# Patient Record
Sex: FEMALE | Race: WHITE | NOT HISPANIC OR LATINO | URBAN - METROPOLITAN AREA
[De-identification: names, ages, dates, MRNs, and addresses within clinical notes are randomized per-mention and may not be internally consistent; named-entity substitution may affect disease eponyms.]

---

## 2017-07-25 ENCOUNTER — INPATIENT (INPATIENT)
Facility: HOSPITAL | Age: 37
LOS: 2 days | Discharge: HOME | DRG: 355 | End: 2017-07-28
Attending: INTERNAL MEDICINE | Admitting: HOSPITALIST
Payer: SELF-PAY

## 2017-07-25 VITALS
WEIGHT: 121.03 LBS | DIASTOLIC BLOOD PRESSURE: 68 MMHG | SYSTOLIC BLOOD PRESSURE: 96 MMHG | TEMPERATURE: 98 F | OXYGEN SATURATION: 97 % | HEART RATE: 59 BPM | HEIGHT: 61 IN | RESPIRATION RATE: 15 BRPM

## 2017-07-25 DIAGNOSIS — K56.69 OTHER INTESTINAL OBSTRUCTION: ICD-10-CM

## 2017-07-25 DIAGNOSIS — Z98.891 HISTORY OF UTERINE SCAR FROM PREVIOUS SURGERY: Chronic | ICD-10-CM

## 2017-07-25 DIAGNOSIS — R07.89 OTHER CHEST PAIN: ICD-10-CM

## 2017-07-25 DIAGNOSIS — D72.829 ELEVATED WHITE BLOOD CELL COUNT, UNSPECIFIED: ICD-10-CM

## 2017-07-25 LAB
ALBUMIN SERPL ELPH-MCNC: 3.4 G/DL — SIGNIFICANT CHANGE UP (ref 3.3–5)
ALP SERPL-CCNC: 46 U/L — SIGNIFICANT CHANGE UP (ref 40–120)
ALT FLD-CCNC: 19 U/L — SIGNIFICANT CHANGE UP (ref 12–78)
AMYLASE P1 CFR SERPL: 31 U/L — SIGNIFICANT CHANGE UP (ref 25–115)
ANION GAP SERPL CALC-SCNC: 5 MMOL/L — SIGNIFICANT CHANGE UP (ref 5–17)
APPEARANCE UR: CLEAR — SIGNIFICANT CHANGE UP
AST SERPL-CCNC: 17 U/L — SIGNIFICANT CHANGE UP (ref 15–37)
BASOPHILS # BLD AUTO: 0.1 K/UL — SIGNIFICANT CHANGE UP (ref 0–0.2)
BASOPHILS NFR BLD AUTO: 0.4 % — SIGNIFICANT CHANGE UP (ref 0–2)
BILIRUB SERPL-MCNC: 0.4 MG/DL — SIGNIFICANT CHANGE UP (ref 0.2–1.2)
BILIRUB UR-MCNC: NEGATIVE — SIGNIFICANT CHANGE UP
BUN SERPL-MCNC: 15 MG/DL — SIGNIFICANT CHANGE UP (ref 7–23)
CALCIUM SERPL-MCNC: 8.6 MG/DL — SIGNIFICANT CHANGE UP (ref 8.5–10.1)
CHLORIDE SERPL-SCNC: 107 MMOL/L — SIGNIFICANT CHANGE UP (ref 96–108)
CO2 SERPL-SCNC: 27 MMOL/L — SIGNIFICANT CHANGE UP (ref 22–31)
COLOR SPEC: YELLOW — SIGNIFICANT CHANGE UP
CREAT SERPL-MCNC: 0.65 MG/DL — SIGNIFICANT CHANGE UP (ref 0.5–1.3)
DIFF PNL FLD: NEGATIVE — SIGNIFICANT CHANGE UP
EOSINOPHIL # BLD AUTO: 0 K/UL — SIGNIFICANT CHANGE UP (ref 0–0.5)
EOSINOPHIL NFR BLD AUTO: 0.3 % — SIGNIFICANT CHANGE UP (ref 0–6)
GLUCOSE SERPL-MCNC: 126 MG/DL — HIGH (ref 70–99)
GLUCOSE UR QL: NEGATIVE — SIGNIFICANT CHANGE UP
HCG SERPL-ACNC: <1 MIU/ML — SIGNIFICANT CHANGE UP
HCT VFR BLD CALC: 39.7 % — SIGNIFICANT CHANGE UP (ref 34.5–45)
HGB BLD-MCNC: 12.8 G/DL — SIGNIFICANT CHANGE UP (ref 11.5–15.5)
KETONES UR-MCNC: NEGATIVE — SIGNIFICANT CHANGE UP
LACTATE SERPL-SCNC: 1 MMOL/L — SIGNIFICANT CHANGE UP (ref 0.7–2)
LEUKOCYTE ESTERASE UR-ACNC: NEGATIVE — SIGNIFICANT CHANGE UP
LIDOCAIN IGE QN: 144 U/L — SIGNIFICANT CHANGE UP (ref 73–393)
LYMPHOCYTES # BLD AUTO: 1.2 K/UL — SIGNIFICANT CHANGE UP (ref 1–3.3)
LYMPHOCYTES # BLD AUTO: 9.1 % — LOW (ref 13–44)
MCHC RBC-ENTMCNC: 28 PG — SIGNIFICANT CHANGE UP (ref 27–34)
MCHC RBC-ENTMCNC: 32.3 GM/DL — SIGNIFICANT CHANGE UP (ref 32–36)
MCV RBC AUTO: 86.5 FL — SIGNIFICANT CHANGE UP (ref 80–100)
MONOCYTES # BLD AUTO: 0.4 K/UL — SIGNIFICANT CHANGE UP (ref 0–0.9)
MONOCYTES NFR BLD AUTO: 3.3 % — SIGNIFICANT CHANGE UP (ref 1–9)
NEUTROPHILS # BLD AUTO: 11.5 K/UL — HIGH (ref 1.8–7.4)
NEUTROPHILS NFR BLD AUTO: 86.9 % — HIGH (ref 43–77)
NITRITE UR-MCNC: NEGATIVE — SIGNIFICANT CHANGE UP
PH UR: 7 — SIGNIFICANT CHANGE UP (ref 5–8)
PLATELET # BLD AUTO: 284 K/UL — SIGNIFICANT CHANGE UP (ref 150–400)
POTASSIUM SERPL-MCNC: 3.9 MMOL/L — SIGNIFICANT CHANGE UP (ref 3.5–5.3)
POTASSIUM SERPL-SCNC: 3.9 MMOL/L — SIGNIFICANT CHANGE UP (ref 3.5–5.3)
PROT SERPL-MCNC: 7.1 G/DL — SIGNIFICANT CHANGE UP (ref 6–8.3)
PROT UR-MCNC: NEGATIVE — SIGNIFICANT CHANGE UP
RBC # BLD: 4.59 M/UL — SIGNIFICANT CHANGE UP (ref 3.8–5.2)
RBC # FLD: 12.1 % — SIGNIFICANT CHANGE UP (ref 10.3–14.5)
SODIUM SERPL-SCNC: 139 MMOL/L — SIGNIFICANT CHANGE UP (ref 135–145)
SP GR SPEC: 1 — LOW (ref 1.01–1.02)
UROBILINOGEN FLD QL: NEGATIVE — SIGNIFICANT CHANGE UP
WBC # BLD: 13.2 K/UL — HIGH (ref 3.8–10.5)
WBC # FLD AUTO: 13.2 K/UL — HIGH (ref 3.8–10.5)

## 2017-07-25 PROCEDURE — 71020: CPT | Mod: 26

## 2017-07-25 PROCEDURE — 74177 CT ABD & PELVIS W/CONTRAST: CPT | Mod: 26

## 2017-07-25 PROCEDURE — 93010 ELECTROCARDIOGRAM REPORT: CPT

## 2017-07-25 PROCEDURE — 44050 REDUCE BOWEL OBSTRUCTION: CPT | Mod: AS

## 2017-07-25 PROCEDURE — 99285 EMERGENCY DEPT VISIT HI MDM: CPT

## 2017-07-25 PROCEDURE — 99223 1ST HOSP IP/OBS HIGH 75: CPT | Mod: AI

## 2017-07-25 PROCEDURE — 99223 1ST HOSP IP/OBS HIGH 75: CPT

## 2017-07-25 PROCEDURE — 71275 CT ANGIOGRAPHY CHEST: CPT | Mod: 26

## 2017-07-25 RX ORDER — ONDANSETRON 8 MG/1
4 TABLET, FILM COATED ORAL ONCE
Qty: 0 | Refills: 0 | Status: COMPLETED | OUTPATIENT
Start: 2017-07-25 | End: 2017-07-25

## 2017-07-25 RX ORDER — SODIUM CHLORIDE 9 MG/ML
3 INJECTION INTRAMUSCULAR; INTRAVENOUS; SUBCUTANEOUS ONCE
Qty: 0 | Refills: 0 | Status: COMPLETED | OUTPATIENT
Start: 2017-07-25 | End: 2017-07-25

## 2017-07-25 RX ORDER — MORPHINE SULFATE 50 MG/1
2 CAPSULE, EXTENDED RELEASE ORAL EVERY 6 HOURS
Qty: 0 | Refills: 0 | Status: DISCONTINUED | OUTPATIENT
Start: 2017-07-25 | End: 2017-07-25

## 2017-07-25 RX ORDER — ENOXAPARIN SODIUM 100 MG/ML
40 INJECTION SUBCUTANEOUS EVERY 24 HOURS
Qty: 0 | Refills: 0 | Status: DISCONTINUED | OUTPATIENT
Start: 2017-07-25 | End: 2017-07-28

## 2017-07-25 RX ORDER — METOCLOPRAMIDE HCL 10 MG
10 TABLET ORAL EVERY 8 HOURS
Qty: 0 | Refills: 0 | Status: DISCONTINUED | OUTPATIENT
Start: 2017-07-25 | End: 2017-07-26

## 2017-07-25 RX ORDER — SODIUM CHLORIDE 9 MG/ML
1000 INJECTION, SOLUTION INTRAVENOUS
Qty: 0 | Refills: 0 | Status: DISCONTINUED | OUTPATIENT
Start: 2017-07-25 | End: 2017-07-27

## 2017-07-25 RX ORDER — MORPHINE SULFATE 50 MG/1
4 CAPSULE, EXTENDED RELEASE ORAL ONCE
Qty: 0 | Refills: 0 | Status: DISCONTINUED | OUTPATIENT
Start: 2017-07-25 | End: 2017-07-25

## 2017-07-25 RX ORDER — ONDANSETRON 8 MG/1
4 TABLET, FILM COATED ORAL EVERY 8 HOURS
Qty: 0 | Refills: 0 | Status: DISCONTINUED | OUTPATIENT
Start: 2017-07-25 | End: 2017-07-25

## 2017-07-25 RX ORDER — SODIUM CHLORIDE 9 MG/ML
1000 INJECTION INTRAMUSCULAR; INTRAVENOUS; SUBCUTANEOUS
Qty: 0 | Refills: 0 | Status: COMPLETED | OUTPATIENT
Start: 2017-07-25 | End: 2017-07-25

## 2017-07-25 RX ORDER — MORPHINE SULFATE 50 MG/1
2 CAPSULE, EXTENDED RELEASE ORAL EVERY 4 HOURS
Qty: 0 | Refills: 0 | Status: DISCONTINUED | OUTPATIENT
Start: 2017-07-25 | End: 2017-07-28

## 2017-07-25 RX ADMIN — SODIUM CHLORIDE 1000 MILLILITER(S): 9 INJECTION INTRAMUSCULAR; INTRAVENOUS; SUBCUTANEOUS at 12:06

## 2017-07-25 RX ADMIN — ENOXAPARIN SODIUM 40 MILLIGRAM(S): 100 INJECTION SUBCUTANEOUS at 21:27

## 2017-07-25 RX ADMIN — MORPHINE SULFATE 4 MILLIGRAM(S): 50 CAPSULE, EXTENDED RELEASE ORAL at 12:21

## 2017-07-25 RX ADMIN — Medication 10 MILLIGRAM(S): at 17:47

## 2017-07-25 RX ADMIN — MORPHINE SULFATE 2 MILLIGRAM(S): 50 CAPSULE, EXTENDED RELEASE ORAL at 21:34

## 2017-07-25 RX ADMIN — MORPHINE SULFATE 2 MILLIGRAM(S): 50 CAPSULE, EXTENDED RELEASE ORAL at 21:27

## 2017-07-25 RX ADMIN — MORPHINE SULFATE 2 MILLIGRAM(S): 50 CAPSULE, EXTENDED RELEASE ORAL at 17:44

## 2017-07-25 RX ADMIN — MORPHINE SULFATE 4 MILLIGRAM(S): 50 CAPSULE, EXTENDED RELEASE ORAL at 10:50

## 2017-07-25 RX ADMIN — MORPHINE SULFATE 2 MILLIGRAM(S): 50 CAPSULE, EXTENDED RELEASE ORAL at 14:11

## 2017-07-25 RX ADMIN — SODIUM CHLORIDE 3 MILLILITER(S): 9 INJECTION INTRAMUSCULAR; INTRAVENOUS; SUBCUTANEOUS at 10:51

## 2017-07-25 RX ADMIN — MORPHINE SULFATE 2 MILLIGRAM(S): 50 CAPSULE, EXTENDED RELEASE ORAL at 13:56

## 2017-07-25 RX ADMIN — ONDANSETRON 104 MILLIGRAM(S): 8 TABLET, FILM COATED ORAL at 13:56

## 2017-07-25 RX ADMIN — MORPHINE SULFATE 2 MILLIGRAM(S): 50 CAPSULE, EXTENDED RELEASE ORAL at 17:23

## 2017-07-25 RX ADMIN — SODIUM CHLORIDE 1000 MILLILITER(S): 9 INJECTION INTRAMUSCULAR; INTRAVENOUS; SUBCUTANEOUS at 11:31

## 2017-07-25 RX ADMIN — MORPHINE SULFATE 4 MILLIGRAM(S): 50 CAPSULE, EXTENDED RELEASE ORAL at 11:05

## 2017-07-25 RX ADMIN — SODIUM CHLORIDE 1000 MILLILITER(S): 9 INJECTION INTRAMUSCULAR; INTRAVENOUS; SUBCUTANEOUS at 10:50

## 2017-07-25 RX ADMIN — ONDANSETRON 4 MILLIGRAM(S): 8 TABLET, FILM COATED ORAL at 10:50

## 2017-07-25 RX ADMIN — MORPHINE SULFATE 4 MILLIGRAM(S): 50 CAPSULE, EXTENDED RELEASE ORAL at 12:06

## 2017-07-25 NOTE — ED PROVIDER NOTE - OBJECTIVE STATEMENT
35 yo F p/w developed epigastric pain this am rad to lower abd and rad to chest. Onset at rest. Mild assoc sob due to pain. Pos nausea , vomiting. No diarrhea. No recent trauma. NO neck/ back pain. No numb/ting/focal weak. No recent travel / sick contacts. NO trauma. no agg/allev factors. No other inj or co.

## 2017-07-25 NOTE — CONSULT NOTE ADULT - GASTROINTESTINAL DETAILS
no guarding/no organomegaly/no rigidity/no masses palpable/no rebound tenderness/no distention/bowel sounds normal/no bruit

## 2017-07-25 NOTE — CONSULT NOTE ADULT - SUBJECTIVE AND OBJECTIVE BOX
36 year old woman presenting with 2 day history of not feeling well with pain radiating to back, chest and abdomen with at least one episode of non bilious vomiting.  CT C/A/P performed in ED without PO contrast suggestive of proximally dilated SB with distal loops collapsed.  Colon however full of gas and stool.  Pt also reports last BM early this morning.  Struggles with chronic constipation.  Denies sick contacts or recent travel.      PAST MEDICAL & SURGICAL HISTORY:  Cholelithiasis/Cholecystitis  S/P  section  Lap Cholecystectomy      MEDICATIONS  (STANDING):  dextrose 5% + sodium chloride 0.9%. 1000 milliLiter(s) (100 mL/Hr) IV Continuous <Continuous>  enoxaparin Injectable 40 milliGRAM(s) SubCutaneous every 24 hours    MEDICATIONS  (PRN):  morphine  - Injectable 2 milliGRAM(s) IV Push every 4 hours PRN Severe Pain (7 - 10)  metoclopramide Injectable 10 milliGRAM(s) IV Push every 8 hours PRN Nausea/ Vomiting      Allergies:  narcotic analgesics (Unknown)      SOCIAL HISTORY:  Denies Tobacco or recent ETOH    FAMILY HISTORY:  No pertinent family history in first degree relatives      Vital Signs Last 24 Hrs  T(C): 37.5 (2017 19:39), Max: 37.5 (2017 19:39)  T(F): 99.5 (2017 19:39), Max: 99.5 (2017 19:39)  HR: 70 (2017 19:39) (59 - 70)  BP: 103/66 (2017 19:39) (96/68 - 118/74)  BP(mean): --  RR: 16 (2017 19:39) (15 - 16)  SpO2: 98% (2017 19:39) (97% - 99%)    LABS:                        12.8   13.2  )-----------( 284      ( 2017 10:45 )             39.7     07-25    139  |  107  |  15  ----------------------------<  126<H>  3.9   |  27  |  0.65    Ca    8.6      2017 10:45    TPro  7.1  /  Alb  3.4  /  TBili  0.4  /  DBili  x   /  AST  17  /  ALT  19  /  AlkPhos  46  07-25      Urinalysis Basic - ( 2017 13:58 )    Color: Yellow / Appearance: Clear / S.005 / pH: x  Gluc: x / Ketone: Negative  / Bili: Negative / Urobili: Negative   Blood: x / Protein: Negative / Nitrite: Negative   Leuk Esterase: Negative / RBC: x / WBC x   Sq Epi: x / Non Sq Epi: x / Bacteria: x        RADIOLOGY & ADDITIONAL STUDIES:      EXAM:  CT ABDOMEN AND PELVIS IC                          EXAM:  CT ANGIO CHEST (W)AW IC                            PROCEDURE DATE:  2017          INTERPRETATION:  CT angiogram of the chest, abdomen, and pelvis. Patient   is post  andhas epigastric pain radiating to the back. There is   also concern of possible pulmonary embolism. Coronal MIP imaging of the   chest was included with the images.    Patient received 95 cc of Omnipaque 350 IV by power injector. 5 cc was   discarded.For the chest pulmonary embolism protocol was utilized.    The included thyroid is unremarkable.    Heart size is within normal limits. The ascending aorta is 2.6 cm which   is in the normal range.    The aorta has a gradual taper.    The mediastinumand rufus appear to be made of normal vascular and tissue   elements. No pulmonary emboli are evident out to the subsegmental level.    The lung fields and pleural surfaces are unremarkable.    The bony thorax is unremarkable.    Abdomen and pelvis.    Clips in the gallbladder area noted.    There is mild diffuse prominence of the biliary ducts often seen after   cholecystectomy.    The liver, spleen, and pancreas are unremarkable.    No adrenal enlargements are evident.    The kidneys appear normal in size, shape, and axis. There are no renal   stones. The kidneys function equally without hydronephrosis.    The abdominal aorta is unremarkable. No celiac or periaortic adenopathy   is evident.    CT pelvis.    The bladder and uterus are grossly normal. No iliac or inguinal   adenopathy is evident.    There is no bowel obstruction. There is no sense of mass or infiltration   relevant to the colon. The cecum lies in the left abdomen. The appendix   is not identified. There are no secondary signs of appendicitis.    There is mild distention of some jejunal loops in the left mid abdomen.   The distention is up to 2.8 cm. The zone of transition appears to be just   behind the umbilicus best seen on image numbers 316 and 317 of series 2.   This is also roughly at the jejunoileal junction. Distal small bowel   loops are decompressed.    Bones of the lower trunk are unremarkable.    IMPRESSION: No evidence of pulmonary emboli. Mobile cecum.    There is mild distention of small bowel loops at about the umbilical   level roughly at the jejunal ileal junction where a transition point is   noted.      VELMA BLAKE M.D., ATTENDING RADIOLOGIST  This document has been electronically signed. 2017  1:02PM

## 2017-07-25 NOTE — H&P ADULT - NSHPLABSRESULTS_GEN_ALL_CORE
LABS:                        12.8   13.2  )-----------( 284      ( 25 Jul 2017 10:45 )             39.7     25 Jul 2017 10:45    139    |  107    |  15     ----------------------------<  126    3.9     |  27     |  0.65     Ca    8.6        25 Jul 2017 10:45    TPro  7.1    /  Alb  3.4    /  TBili  0.4    /  DBili  x      /  AST  17     /  ALT  19     /  AlkPhos  46     25 Jul 2017 10:45      CAPILLARY BLOOD GLUCOSE        BLOOD CULTURE    RADIOLOGY & ADDITIONAL TESTS:    Imaging Personally Reviewed:  [ ] YES     Consultant(s) Notes Reviewed:      Care Discussed with Consultants/Other Providers:

## 2017-07-25 NOTE — ED PROVIDER NOTE - ENMT, MLM
Airway patent, Nasal mucosa clear. Mouth with normal mucosa. Throat has no vesicles, no oropharyngeal exudates and uvula is midline. MM Moist

## 2017-07-25 NOTE — H&P ADULT - NSHPREVIEWOFSYSTEMS_GEN_ALL_CORE
CONSTITUTIONAL: No fever, weight loss, or fatigue  EYES: No eye pain, visual disturbances, or discharge  ENMT:  No difficulty hearing, tinnitus, vertigo; No sinus or throat pain  NECK: No pain or stiffness  BREASTS: No pain, masses, or nipple discharge  RESPIRATORY: No cough, wheezing, chills or hemoptysis; No shortness of breath  CARDIOVASCULAR: No chest pain, palpitations, dizziness, or leg swelling  GASTROINTESTINAL: Dull generalized abdominal pain, nausea and vomiting x 2, No diarrhea or constipation. No melena or hematochezia.  GENITOURINARY: No dysuria, frequency, hematuria, or incontinence  NEUROLOGICAL: No headaches, memory loss, loss of strength, numbness, or tremors  SKIN: No itching, burning, rashes, or lesions   LYMPH NODES: No enlarged glands  ENDOCRINE: No heat or cold intolerance; No hair loss; No polydipsia or polyuria  MUSCULOSKELETAL: No joint pain or swelling; No muscle, back, or extremity pain  PSYCHIATRIC: No depression, anxiety, mood swings, or difficulty sleeping  HEME/LYMPH: No easy bruising, or bleeding gums  ALLERGY AND IMMUNOLOGIC: No hives or eczema

## 2017-07-25 NOTE — CONSULT NOTE ADULT - NEGATIVE GENERAL SYMPTOMS
no anorexia/no weight gain/no polyuria/no sweating/no polyphagia/no polydipsia/no weight loss/no fever

## 2017-07-25 NOTE — CONSULT NOTE ADULT - NEGATIVE GASTROINTESTINAL SYMPTOMS
no steatorrhea/no hematochezia/no hiccoughs/no diarrhea/no jaundice/no change in bowel habits/no nausea/no melena

## 2017-07-25 NOTE — PROGRESS NOTE ADULT - SUBJECTIVE AND OBJECTIVE BOX
Pt seen and examined  Images reviewed.   Full consult not to follow.    Partial SBO vs ileus vs enteritis.  Flu like symptoms with generalized body aches, nausea and vomiting x 1.  Last BM this morning.    Recommend Medical evaluation and possible admission.  No acute surgical intervention at this time.   Repeat Abd xray flat and upright in am.  PPI's.  IVF's.  Keep NPO.    Will follow.

## 2017-07-25 NOTE — H&P ADULT - HISTORY OF PRESENT ILLNESS
35 y/o F h/o, cholecystectomy, C sections X3 presents c/o generalized abdominal pain, constant, dull, non radiating associated with nausea, vomiting x 2 this am. Denies any chest pain, sob or any other symptoms. Admitted for SBO. 37 y/o F h/o, cholecystectomy, C sections X3 presents c/o generalized abdominal pain, constant, dull, non radiating associated with nausea, vomiting x 2 this am. Denies any chest pain, sob or any other symptoms. Also reported mild chest pain associated with abdominal pain to ER physician. But no chest pain now.  Admitted for SBO.

## 2017-07-25 NOTE — H&P ADULT - NSHPPHYSICALEXAM_GEN_ALL_CORE
Vital Signs Last 24 Hrs  T(C): 36.8 (25 Jul 2017 12:55), Max: 36.8 (25 Jul 2017 12:55)  T(F): 98.3 (25 Jul 2017 12:55), Max: 98.3 (25 Jul 2017 12:55)  HR: 65 (25 Jul 2017 12:55) (59 - 65)  BP: 112/72 (25 Jul 2017 12:55) (96/68 - 112/72)  BP(mean): --  RR: 16 (25 Jul 2017 12:55) (15 - 16)  SpO2: 98% (25 Jul 2017 12:55) (97% - 98%)    PHYSICAL EXAM:  GENERAL: NAD, well-groomed, well-developed  HEAD:  Atraumatic, Normocephalic  EYES: EOMI, PERRLA, conjunctiva and sclera clear  ENMT: No tonsillar erythema, exudates, or enlargement; Moist mucous membranes, Good dentition, No lesions  NECK: Supple, No JVD, Normal thyroid  NERVOUS SYSTEM:  Alert & Oriented X3, Good concentration; Motor Strength 5/5 B/L upper and lower extremities; DTRs 2+ intact and symmetric  CHEST/LUNG: Clear to auscultation bilaterally; No rales, rhonchi, wheezing, or rubs  HEART: Regular rate and rhythm; No murmurs, rubs, or gallops  ABDOMEN: Distended, mild generalized abdominal tenderness; Bowel sounds absent  EXTREMITIES:  2+ Peripheral Pulses, No clubbing, cyanosis, or edema  LYMPH: No lymphadenopathy noted  SKIN: No rashes or lesions

## 2017-07-26 DIAGNOSIS — E83.39 OTHER DISORDERS OF PHOSPHORUS METABOLISM: ICD-10-CM

## 2017-07-26 DIAGNOSIS — E87.6 HYPOKALEMIA: ICD-10-CM

## 2017-07-26 LAB
ANION GAP SERPL CALC-SCNC: 7 MMOL/L — SIGNIFICANT CHANGE UP (ref 5–17)
BUN SERPL-MCNC: 6 MG/DL — LOW (ref 7–23)
CALCIUM SERPL-MCNC: 7.3 MG/DL — LOW (ref 8.5–10.1)
CHLORIDE SERPL-SCNC: 109 MMOL/L — HIGH (ref 96–108)
CO2 SERPL-SCNC: 26 MMOL/L — SIGNIFICANT CHANGE UP (ref 22–31)
CREAT SERPL-MCNC: 0.48 MG/DL — LOW (ref 0.5–1.3)
CULTURE RESULTS: SIGNIFICANT CHANGE UP
GLUCOSE SERPL-MCNC: 121 MG/DL — HIGH (ref 70–99)
HCT VFR BLD CALC: 36.4 % — SIGNIFICANT CHANGE UP (ref 34.5–45)
HGB BLD-MCNC: 11.8 G/DL — SIGNIFICANT CHANGE UP (ref 11.5–15.5)
INR BLD: 1.07 RATIO — SIGNIFICANT CHANGE UP (ref 0.88–1.16)
MCHC RBC-ENTMCNC: 27.8 PG — SIGNIFICANT CHANGE UP (ref 27–34)
MCHC RBC-ENTMCNC: 32.3 GM/DL — SIGNIFICANT CHANGE UP (ref 32–36)
MCV RBC AUTO: 86.2 FL — SIGNIFICANT CHANGE UP (ref 80–100)
PLATELET # BLD AUTO: 268 K/UL — SIGNIFICANT CHANGE UP (ref 150–400)
POTASSIUM SERPL-MCNC: 3.2 MMOL/L — LOW (ref 3.5–5.3)
POTASSIUM SERPL-SCNC: 3.2 MMOL/L — LOW (ref 3.5–5.3)
PROTHROM AB SERPL-ACNC: 11.7 SEC — SIGNIFICANT CHANGE UP (ref 9.8–12.7)
RBC # BLD: 4.22 M/UL — SIGNIFICANT CHANGE UP (ref 3.8–5.2)
RBC # FLD: 11.7 % — SIGNIFICANT CHANGE UP (ref 10.3–14.5)
SODIUM SERPL-SCNC: 142 MMOL/L — SIGNIFICANT CHANGE UP (ref 135–145)
SPECIMEN SOURCE: SIGNIFICANT CHANGE UP
WBC # BLD: 11.1 K/UL — HIGH (ref 3.8–10.5)
WBC # FLD AUTO: 11.1 K/UL — HIGH (ref 3.8–10.5)

## 2017-07-26 PROCEDURE — 74020: CPT | Mod: 26

## 2017-07-26 PROCEDURE — 99233 SBSQ HOSP IP/OBS HIGH 50: CPT | Mod: GC

## 2017-07-26 PROCEDURE — 99232 SBSQ HOSP IP/OBS MODERATE 35: CPT | Mod: 57

## 2017-07-26 RX ORDER — POTASSIUM CHLORIDE 20 MEQ
40 PACKET (EA) ORAL EVERY 4 HOURS
Qty: 0 | Refills: 0 | Status: COMPLETED | OUTPATIENT
Start: 2017-07-26 | End: 2017-07-26

## 2017-07-26 RX ORDER — KETOROLAC TROMETHAMINE 30 MG/ML
15 SYRINGE (ML) INJECTION EVERY 6 HOURS
Qty: 0 | Refills: 0 | Status: DISCONTINUED | OUTPATIENT
Start: 2017-07-26 | End: 2017-07-28

## 2017-07-26 RX ORDER — METOCLOPRAMIDE HCL 10 MG
5 TABLET ORAL ONCE
Qty: 0 | Refills: 0 | Status: COMPLETED | OUTPATIENT
Start: 2017-07-26 | End: 2017-07-26

## 2017-07-26 RX ORDER — METOCLOPRAMIDE HCL 10 MG
5 TABLET ORAL EVERY 8 HOURS
Qty: 0 | Refills: 0 | Status: DISCONTINUED | OUTPATIENT
Start: 2017-07-26 | End: 2017-07-27

## 2017-07-26 RX ORDER — ACETAMINOPHEN 500 MG
650 TABLET ORAL EVERY 6 HOURS
Qty: 0 | Refills: 0 | Status: DISCONTINUED | OUTPATIENT
Start: 2017-07-26 | End: 2017-07-28

## 2017-07-26 RX ORDER — PANTOPRAZOLE SODIUM 20 MG/1
40 TABLET, DELAYED RELEASE ORAL DAILY
Qty: 0 | Refills: 0 | Status: DISCONTINUED | OUTPATIENT
Start: 2017-07-26 | End: 2017-07-28

## 2017-07-26 RX ADMIN — Medication 5 MILLIGRAM(S): at 21:41

## 2017-07-26 RX ADMIN — MORPHINE SULFATE 2 MILLIGRAM(S): 50 CAPSULE, EXTENDED RELEASE ORAL at 02:00

## 2017-07-26 RX ADMIN — MORPHINE SULFATE 2 MILLIGRAM(S): 50 CAPSULE, EXTENDED RELEASE ORAL at 09:04

## 2017-07-26 RX ADMIN — Medication 5 MILLIGRAM(S): at 16:52

## 2017-07-26 RX ADMIN — MORPHINE SULFATE 2 MILLIGRAM(S): 50 CAPSULE, EXTENDED RELEASE ORAL at 14:51

## 2017-07-26 RX ADMIN — Medication 40 MILLIEQUIVALENT(S): at 14:51

## 2017-07-26 RX ADMIN — MORPHINE SULFATE 2 MILLIGRAM(S): 50 CAPSULE, EXTENDED RELEASE ORAL at 01:52

## 2017-07-26 RX ADMIN — Medication 10 MILLIGRAM(S): at 01:49

## 2017-07-26 RX ADMIN — MORPHINE SULFATE 2 MILLIGRAM(S): 50 CAPSULE, EXTENDED RELEASE ORAL at 22:00

## 2017-07-26 RX ADMIN — Medication 40 MILLIEQUIVALENT(S): at 10:21

## 2017-07-26 RX ADMIN — MORPHINE SULFATE 2 MILLIGRAM(S): 50 CAPSULE, EXTENDED RELEASE ORAL at 21:46

## 2017-07-26 RX ADMIN — MORPHINE SULFATE 2 MILLIGRAM(S): 50 CAPSULE, EXTENDED RELEASE ORAL at 09:20

## 2017-07-26 RX ADMIN — MORPHINE SULFATE 2 MILLIGRAM(S): 50 CAPSULE, EXTENDED RELEASE ORAL at 15:10

## 2017-07-26 RX ADMIN — ENOXAPARIN SODIUM 40 MILLIGRAM(S): 100 INJECTION SUBCUTANEOUS at 21:40

## 2017-07-26 RX ADMIN — Medication 63.75 MILLIMOLE(S): at 16:40

## 2017-07-26 RX ADMIN — SODIUM CHLORIDE 100 MILLILITER(S): 9 INJECTION, SOLUTION INTRAVENOUS at 16:40

## 2017-07-26 RX ADMIN — PANTOPRAZOLE SODIUM 40 MILLIGRAM(S): 20 TABLET, DELAYED RELEASE ORAL at 13:03

## 2017-07-26 NOTE — PROGRESS NOTE ADULT - ASSESSMENT
35 y/o F with PSHx of 3 c-sections and cholecystectomy admitted for SBO. 37 y/o F with PSHx of 3 c-sections and cholecystectomy admitted for partial  SBO.

## 2017-07-26 NOTE — PROGRESS NOTE ADULT - PROBLEM SELECTOR PLAN 1
CT abd/pel showed SBO  Pt NPO and on IVF  Abd pain persisting  FU Dr. Capone (surg) recs  FU XR abd from this AM CT abd/pel showed SBO  Upright XR showed dilated bowel, no change from previous exam  Pt NPO and on IVF  Abd pain persisting  Per Dr. Capone (surg) cont NPO and monitoring Pt clinically

## 2017-07-26 NOTE — PROGRESS NOTE ADULT - ASSESSMENT
Epigastric abdominal pain, crampy.  Xrays suggestive of proximal fluid filled SB possible Partial SBO.

## 2017-07-26 NOTE — PROGRESS NOTE ADULT - SUBJECTIVE AND OBJECTIVE BOX
Pt seen and examined.  Still with crampy abdominal pains.  Reports only small BM with little flatus.  Symptoms improved from admission but not completely resolved.    Vital Signs Last 24 Hrs  T(C): 37.4 (26 Jul 2017 19:37), Max: 37.4 (26 Jul 2017 19:37)  T(F): 99.4 (26 Jul 2017 19:37), Max: 99.4 (26 Jul 2017 19:37)  HR: 60 (26 Jul 2017 19:37) (60 - 83)  BP: 105/69 (26 Jul 2017 19:37) (103/62 - 105/69)  BP(mean): --  RR: 16 (26 Jul 2017 19:37) (16 - 16)  SpO2: 99% (26 Jul 2017 19:37) (99% - 100%)    07-25 @ 07:01  -  07-26 @ 07:00  --------------------------------------------------------  IN: 1200 mL / OUT: 0 mL / NET: 1200 mL    07-26 @ 07:01  -  07-26 @ 21:23  --------------------------------------------------------  IN: 1150 mL / OUT: 0 mL / NET: 1150 mL                              11.8   11.1  )-----------( 268      ( 26 Jul 2017 06:45 )             36.4     07-26    142  |  109<H>  |  6<L>  ----------------------------<  121<H>  3.2<L>   |  26  |  0.48<L>    Ca    7.3<L>      26 Jul 2017 06:45  Phos  2.1     07-26  Mg     1.9     07-26    TPro  7.1  /  Alb  3.4  /  TBili  0.4  /  DBili  x   /  AST  17  /  ALT  19  /  AlkPhos  46  07-25      Physical Exam:  Awake alert and oriented x3 in no acute distress. NCAT, PERRLA, EOMI  Lungs clear bilaterally without wheezes rhonchi or rales.  Regular rate and rhythm  Abdomen soft, nontender, nondistended, positive bowel sounds in all 4 quadrants. No evidence of hernia or masses.  Mild epigastric tenderness.  No rebound or guarding.  Extremities without edema or tenderness.

## 2017-07-26 NOTE — PROGRESS NOTE ADULT - PROBLEM SELECTOR PLAN 1
May have ice chips.  Will order GI series tomorrow to further assess for potential obstruction as original CT was without PO contrast.  Continue Reglan.  Monitor symptoms.

## 2017-07-26 NOTE — PROGRESS NOTE ADULT - SUBJECTIVE AND OBJECTIVE BOX
Patient is a 36y old  Female who presents with a chief complaint of Abdominal pain, nausea, vomiting x 2 this am (2017 14:13)      INTERVAL HPI/OVERNIGHT EVENTS: Pt S+E at bedside. No overnight events. Pt c/o abd pain that hasn't been improving since admission. Pt states morphine has been giving her headaches as well. Denies fever/chills, CP, SOB. Pt NPO for SBO.    MEDICATIONS  (STANDING):  dextrose 5% + sodium chloride 0.9%. 1000 milliLiter(s) (100 mL/Hr) IV Continuous <Continuous>  enoxaparin Injectable 40 milliGRAM(s) SubCutaneous every 24 hours  potassium chloride    Tablet ER 40 milliEquivalent(s) Oral every 4 hours  pantoprazole  Injectable 40 milliGRAM(s) IV Push daily    MEDICATIONS  (PRN):  morphine  - Injectable 2 milliGRAM(s) IV Push every 4 hours PRN Severe Pain (7 - 10)  metoclopramide Injectable 10 milliGRAM(s) IV Push every 8 hours PRN Nausea/ Vomiting  acetaminophen   Tablet. 650 milliGRAM(s) Oral every 6 hours PRN Mild Pain (1 - 3)      Allergies    narcotic analgesics (Unknown)    Intolerances        REVIEW OF SYSTEMS:  CONSTITUTIONAL: No fever or chills  HEENT:  + headache, no sore throat  RESPIRATORY: No cough, wheezing, or shortness of breath  CARDIOVASCULAR: No chest pain, palpitations, or leg swelling  GASTROINTESTINAL: No vomiting, or diarrhea, +abd pain/nausea  GENITOURINARY: No dysuria, frequency, or hematuria  NEUROLOGICAL: no focal weakness or dizziness  SKIN:  No rashes or lesions   MUSCULOSKELETAL: no myalgias   PSYCHIATRIC: No depression or anxiety  ROS Unable to obtain due to - [ ] Dementia  [ ] Lethargy  REST OF REVIEW Of SYSTEM - [x] Normal     Vital Signs Last 24 Hrs  T(C): 37.1 (2017 04:45), Max: 37.5 (2017 19:39)  T(F): 98.8 (2017 04:45), Max: 99.5 (2017 19:39)  HR: 83 (2017 04:45) (61 - 83)  BP: 103/69 (2017 04:45) (103/66 - 118/74)  BP(mean): --  RR: 16 (2017 04:45) (15 - 16)  SpO2: 99% (2017 04:45) (98% - 99%)    PHYSICAL EXAM:  GENERAL: No Acute Distress  HEENT:  EOMI, mucous membranes moist  CHEST/LUNG:  Clear To Auscultation b/l, no rales, wheezes, or rhonchi  HEART:  Regular Rate Rhythm, S1, S2, [-]murmur  ABDOMEN:  Bowel Sounds+, soft, Mild TTP, Non-Distended  EXTREMITIES: no edema or calf tenderness  SKIN:  no rash  NERVOUS SYSTEM: AA&Ox3    DIET:     LABS:                        11.8   11.1  )-----------( 268      ( 2017 06:45 )             36.4     CBC Full  -  ( 2017 06:45 )  WBC Count : 11.1 K/uL  Hemoglobin : 11.8 g/dL  Hematocrit : 36.4 %  Platelet Count - Automated : 268 K/uL  Mean Cell Volume : 86.2 fl  Mean Cell Hemoglobin : 27.8 pg  Mean Cell Hemoglobin Concentration : 32.3 gm/dL  Auto Neutrophil # : x  Auto Lymphocyte # : x  Auto Monocyte # : x  Auto Eosinophil # : x  Auto Basophil # : x  Auto Neutrophil % : x  Auto Lymphocyte % : x  Auto Monocyte % : x  Auto Eosinophil % : x  Auto Basophil % : x    2017 06:45    142    |  109    |  6      ----------------------------<  121    3.2     |  26     |  0.48     Ca    7.3        2017 06:45  Phos  2.1       2017 06:45  Mg     1.9       2017 06:45    TPro  7.1    /  Alb  3.4    /  TBili  0.4    /  DBili  x      /  AST  17     /  ALT  19     /  AlkPhos  46     2017 10:45      Urinalysis Basic - ( 2017 13:58 )    Color: Yellow / Appearance: Clear / S.005 / pH: x  Gluc: x / Ketone: Negative  / Bili: Negative / Urobili: Negative   Blood: x / Protein: Negative / Nitrite: Negative   Leuk Esterase: Negative / RBC: x / WBC x   Sq Epi: x / Non Sq Epi: x / Bacteria: x      CAPILLARY BLOOD GLUCOSE        RADIOLOGY & ADDITIONAL TESTS:      EXAM:  CT ABDOMEN AND PELVIS IC                          EXAM:  CT ANGIO CHEST (W)AW IC                            PROCEDURE DATE:  2017          INTERPRETATION:  CT angiogram of the chest, abdomen, and pelvis. Patient   is post  and has epigastric pain radiating to the back. There is   also concern of possible pulmonary embolism. Coronal MIP imaging of the   chest was included with the images.    Patient received 95 cc of Omnipaque 350 IV by power injector. 5 cc was   discarded. For the chest pulmonary embolism protocol was utilized.    The included thyroid is unremarkable.    Heart size is within normal limits. The ascending aorta is 2.6 cm which   is in the normal range.    The aorta has a gradual taper.    The mediastinum and rufus appear to be made of normal vascular and tissue   elements. No pulmonary emboli are evident out to the subsegmental level.    The lung fields and pleural surfaces are unremarkable.    The bony thorax is unremarkable.    Abdomen and pelvis.    Clips in the gallbladder area noted.    There is mild diffuse prominence of the biliary ducts often seen after   cholecystectomy.    The liver, spleen, and pancreas are unremarkable.    No adrenal enlargements are evident.    The kidneys appear normal in size, shape, and axis. There are no renal   stones. The kidneys function equally without hydronephrosis.    The abdominal aorta is unremarkable. No celiac or periaortic adenopathy   is evident.    CT pelvis.    The bladder and uterus are grossly normal. No iliac or inguinal   adenopathy is evident.    There is no bowel obstruction. There is no sense of mass or infiltration   relevant to the colon. The cecum lies in the left abdomen. The appendix   is not identified. There are no secondary signs of appendicitis.    There is mild distention of some jejunal loops in the left mid abdomen.   The distention is up to 2.8 cm. The zone of transition appears to be just   behind the umbilicus best seen on image numbers 316 and 317 of series 2.   This is also roughly at the jejunoileal junction. Distal small bowel   loops are decompressed.    Bones of the lower trunk are unremarkable.    IMPRESSION: No evidence of pulmonary emboli. Mobile cecum.    There is mild distention of small bowel loops at about the umbilical   level roughly at the jejunal ileal junction where a transition point is   noted.      VELMA BLAKE M.D., ATTENDING RADIOLOGIST  This document has been electronically signed. 2017  1:02PM    Personally reviewed.     Consultant(s) Notes Reviewed:  [x] YES  [ ] NO    Care Discussed with [ ] Consultants  [x] Patient  [ ] Family  [x]      [ ] Other; RN  DVT ppx  Advanced directive Patient is a 36y old  Female who presents with a chief complaint of Abdominal pain, nausea, vomiting x 2 this am (2017 14:13)      INTERVAL HPI/OVERNIGHT EVENTS: Pt S+E at bedside. No overnight events. Pt c/o abd pain that hasn't been improving since admission. Pt states morphine has been giving her headaches as well. Denies fever/chills, CP, SOB. Pt NPO for SBO.    MEDICATIONS  (STANDING):  dextrose 5% + sodium chloride 0.9%. 1000 milliLiter(s) (100 mL/Hr) IV Continuous <Continuous>  enoxaparin Injectable 40 milliGRAM(s) SubCutaneous every 24 hours  potassium chloride    Tablet ER 40 milliEquivalent(s) Oral every 4 hours  pantoprazole  Injectable 40 milliGRAM(s) IV Push daily    MEDICATIONS  (PRN):  morphine  - Injectable 2 milliGRAM(s) IV Push every 4 hours PRN Severe Pain (7 - 10)  metoclopramide Injectable 10 milliGRAM(s) IV Push every 8 hours PRN Nausea/ Vomiting  acetaminophen   Tablet. 650 milliGRAM(s) Oral every 6 hours PRN Mild Pain (1 - 3)      Allergies    narcotic analgesics (Unknown)    Intolerances        REVIEW OF SYSTEMS:  CONSTITUTIONAL: No fever or chills  HEENT:  + headache, no sore throat  RESPIRATORY: No cough, wheezing, or shortness of breath  CARDIOVASCULAR: No chest pain, palpitations, or leg swelling  GASTROINTESTINAL: No vomiting, or diarrhea, +abd pain/nausea  GENITOURINARY: No dysuria, frequency, or hematuria  NEUROLOGICAL: no focal weakness or dizziness  SKIN:  No rashes or lesions   MUSCULOSKELETAL: no myalgias   PSYCHIATRIC: No depression or anxiety  ROS Unable to obtain due to - [ ] Dementia  [ ] Lethargy  REST OF REVIEW Of SYSTEM - [x] Normal     Vital Signs Last 24 Hrs  T(C): 37.1 (2017 04:45), Max: 37.5 (2017 19:39)  T(F): 98.8 (2017 04:45), Max: 99.5 (2017 19:39)  HR: 83 (2017 04:45) (61 - 83)  BP: 103/69 (2017 04:45) (103/66 - 118/74)  BP(mean): --  RR: 16 (2017 04:45) (15 - 16)  SpO2: 99% (2017 04:45) (98% - 99%)    PHYSICAL EXAM:  GENERAL: mild distress sec to intermittent pain  HEENT:  EOMI, mucous membranes moist  CHEST/LUNG:  Clear To Auscultation b/l, no rales, wheezes, or rhonchi  HEART:  Regular Rate Rhythm, S1, S2, [-]murmur  ABDOMEN:  Bowel Sounds+, soft, Mild to mod TTP, Non-Distended  EXTREMITIES: no edema or calf tenderness  SKIN:  no rash  NERVOUS SYSTEM: AA&Ox3    DIET:     LABS:                        11.8   11.1  )-----------( 268      ( 2017 06:45 )             36.4     CBC Full  -  ( 2017 06:45 )  WBC Count : 11.1 K/uL  Hemoglobin : 11.8 g/dL  Hematocrit : 36.4 %  Platelet Count - Automated : 268 K/uL  Mean Cell Volume : 86.2 fl  Mean Cell Hemoglobin : 27.8 pg  Mean Cell Hemoglobin Concentration : 32.3 gm/dL  Auto Neutrophil # : x  Auto Lymphocyte # : x  Auto Monocyte # : x  Auto Eosinophil # : x  Auto Basophil # : x  Auto Neutrophil % : x  Auto Lymphocyte % : x  Auto Monocyte % : x  Auto Eosinophil % : x  Auto Basophil % : x    2017 06:45    142    |  109    |  6      ----------------------------<  121    3.2     |  26     |  0.48     Ca    7.3        2017 06:45  Phos  2.1       2017 06:45  Mg     1.9       2017 06:45    TPro  7.1    /  Alb  3.4    /  TBili  0.4    /  DBili  x      /  AST  17     /  ALT  19     /  AlkPhos  46     2017 10:45      Urinalysis Basic - ( 2017 13:58 )    Color: Yellow / Appearance: Clear / S.005 / pH: x  Gluc: x / Ketone: Negative  / Bili: Negative / Urobili: Negative   Blood: x / Protein: Negative / Nitrite: Negative   Leuk Esterase: Negative / RBC: x / WBC x   Sq Epi: x / Non Sq Epi: x / Bacteria: x      CAPILLARY BLOOD GLUCOSE        RADIOLOGY & ADDITIONAL TESTS:      EXAM:  CT ABDOMEN AND PELVIS IC                          EXAM:  CT ANGIO CHEST (W)AW IC                            PROCEDURE DATE:  2017          INTERPRETATION:  CT angiogram of the chest, abdomen, and pelvis. Patient   is post  and has epigastric pain radiating to the back. There is   also concern of possible pulmonary embolism. Coronal MIP imaging of the   chest was included with the images.    Patient received 95 cc of Omnipaque 350 IV by power injector. 5 cc was   discarded. For the chest pulmonary embolism protocol was utilized.    The included thyroid is unremarkable.    Heart size is within normal limits. The ascending aorta is 2.6 cm which   is in the normal range.    The aorta has a gradual taper.    The mediastinum and rufus appear to be made of normal vascular and tissue   elements. No pulmonary emboli are evident out to the subsegmental level.    The lung fields and pleural surfaces are unremarkable.    The bony thorax is unremarkable.    Abdomen and pelvis.    Clips in the gallbladder area noted.    There is mild diffuse prominence of the biliary ducts often seen after   cholecystectomy.    The liver, spleen, and pancreas are unremarkable.    No adrenal enlargements are evident.    The kidneys appear normal in size, shape, and axis. There are no renal   stones. The kidneys function equally without hydronephrosis.    The abdominal aorta is unremarkable. No celiac or periaortic adenopathy   is evident.    CT pelvis.    The bladder and uterus are grossly normal. No iliac or inguinal   adenopathy is evident.    There is no bowel obstruction. There is no sense of mass or infiltration   relevant to the colon. The cecum lies in the left abdomen. The appendix   is not identified. There are no secondary signs of appendicitis.    There is mild distention of some jejunal loops in the left mid abdomen.   The distention is up to 2.8 cm. The zone of transition appears to be just   behind the umbilicus best seen on image numbers 316 and 317 of series 2.   This is also roughly at the jejunoileal junction. Distal small bowel   loops are decompressed.    Bones of the lower trunk are unremarkable.    IMPRESSION: No evidence of pulmonary emboli. Mobile cecum.    There is mild distention of small bowel loops at about the umbilical   level roughly at the jejunal ileal junction where a transition point is   noted.      VELMA BLAKE M.D., ATTENDING RADIOLOGIST  This document has been electronically signed. 2017  1:02PM    Personally reviewed.     Consultant(s) Notes Reviewed:  [x] YES  [ ] NO    Care Discussed with [ ] Consultants  [x] Patient  [ ] Family  [x]      [ ] Other; RN  DVT ppx  Advanced directive

## 2017-07-27 ENCOUNTER — TRANSCRIPTION ENCOUNTER (OUTPATIENT)
Age: 37
End: 2017-07-27

## 2017-07-27 DIAGNOSIS — R11.2 NAUSEA WITH VOMITING, UNSPECIFIED: ICD-10-CM

## 2017-07-27 DIAGNOSIS — E83.39 OTHER DISORDERS OF PHOSPHORUS METABOLISM: ICD-10-CM

## 2017-07-27 LAB
ANION GAP SERPL CALC-SCNC: 6 MMOL/L — SIGNIFICANT CHANGE UP (ref 5–17)
BUN SERPL-MCNC: 5 MG/DL — LOW (ref 7–23)
CALCIUM SERPL-MCNC: 8.1 MG/DL — LOW (ref 8.5–10.1)
CHLORIDE SERPL-SCNC: 106 MMOL/L — SIGNIFICANT CHANGE UP (ref 96–108)
CO2 SERPL-SCNC: 28 MMOL/L — SIGNIFICANT CHANGE UP (ref 22–31)
CREAT SERPL-MCNC: 0.5 MG/DL — SIGNIFICANT CHANGE UP (ref 0.5–1.3)
GLUCOSE SERPL-MCNC: 121 MG/DL — HIGH (ref 70–99)
HCT VFR BLD CALC: 40.8 % — SIGNIFICANT CHANGE UP (ref 34.5–45)
HGB BLD-MCNC: 13.4 G/DL — SIGNIFICANT CHANGE UP (ref 11.5–15.5)
MCHC RBC-ENTMCNC: 28.6 PG — SIGNIFICANT CHANGE UP (ref 27–34)
MCHC RBC-ENTMCNC: 32.8 GM/DL — SIGNIFICANT CHANGE UP (ref 32–36)
MCV RBC AUTO: 87.2 FL — SIGNIFICANT CHANGE UP (ref 80–100)
PLATELET # BLD AUTO: 288 K/UL — SIGNIFICANT CHANGE UP (ref 150–400)
POTASSIUM SERPL-MCNC: 3.6 MMOL/L — SIGNIFICANT CHANGE UP (ref 3.5–5.3)
POTASSIUM SERPL-SCNC: 3.6 MMOL/L — SIGNIFICANT CHANGE UP (ref 3.5–5.3)
RBC # BLD: 4.68 M/UL — SIGNIFICANT CHANGE UP (ref 3.8–5.2)
RBC # FLD: 12.2 % — SIGNIFICANT CHANGE UP (ref 10.3–14.5)
SODIUM SERPL-SCNC: 140 MMOL/L — SIGNIFICANT CHANGE UP (ref 135–145)
WBC # BLD: 9.2 K/UL — SIGNIFICANT CHANGE UP (ref 3.8–10.5)
WBC # FLD AUTO: 9.2 K/UL — SIGNIFICANT CHANGE UP (ref 3.8–10.5)

## 2017-07-27 PROCEDURE — 44050 REDUCE BOWEL OBSTRUCTION: CPT

## 2017-07-27 PROCEDURE — 74245: CPT | Mod: 26

## 2017-07-27 PROCEDURE — 99233 SBSQ HOSP IP/OBS HIGH 50: CPT | Mod: GC

## 2017-07-27 RX ORDER — HYDROMORPHONE HYDROCHLORIDE 2 MG/ML
0.5 INJECTION INTRAMUSCULAR; INTRAVENOUS; SUBCUTANEOUS
Qty: 0 | Refills: 0 | Status: DISCONTINUED | OUTPATIENT
Start: 2017-07-27 | End: 2017-07-27

## 2017-07-27 RX ORDER — SODIUM CHLORIDE 9 MG/ML
1000 INJECTION, SOLUTION INTRAVENOUS
Qty: 0 | Refills: 0 | Status: DISCONTINUED | OUTPATIENT
Start: 2017-07-27 | End: 2017-07-27

## 2017-07-27 RX ORDER — METOCLOPRAMIDE HCL 10 MG
5 TABLET ORAL EVERY 8 HOURS
Qty: 0 | Refills: 0 | Status: DISCONTINUED | OUTPATIENT
Start: 2017-07-27 | End: 2017-07-28

## 2017-07-27 RX ORDER — SODIUM CHLORIDE 9 MG/ML
1000 INJECTION, SOLUTION INTRAVENOUS
Qty: 0 | Refills: 0 | Status: DISCONTINUED | OUTPATIENT
Start: 2017-07-27 | End: 2017-07-28

## 2017-07-27 RX ORDER — ONDANSETRON 8 MG/1
4 TABLET, FILM COATED ORAL EVERY 6 HOURS
Qty: 0 | Refills: 0 | Status: DISCONTINUED | OUTPATIENT
Start: 2017-07-27 | End: 2017-07-28

## 2017-07-27 RX ORDER — METOCLOPRAMIDE HCL 10 MG
2 TABLET ORAL EVERY 8 HOURS
Qty: 0 | Refills: 0 | Status: DISCONTINUED | OUTPATIENT
Start: 2017-07-27 | End: 2017-07-27

## 2017-07-27 RX ORDER — CEFAZOLIN SODIUM 1 G
2000 VIAL (EA) INJECTION ONCE
Qty: 0 | Refills: 0 | Status: COMPLETED | OUTPATIENT
Start: 2017-07-27 | End: 2017-07-27

## 2017-07-27 RX ADMIN — Medication 5 MILLIGRAM(S): at 05:37

## 2017-07-27 RX ADMIN — Medication 5 MILLIGRAM(S): at 13:45

## 2017-07-27 RX ADMIN — MORPHINE SULFATE 2 MILLIGRAM(S): 50 CAPSULE, EXTENDED RELEASE ORAL at 14:55

## 2017-07-27 RX ADMIN — PANTOPRAZOLE SODIUM 40 MILLIGRAM(S): 20 TABLET, DELAYED RELEASE ORAL at 13:39

## 2017-07-27 RX ADMIN — SODIUM CHLORIDE 100 MILLILITER(S): 9 INJECTION, SOLUTION INTRAVENOUS at 20:17

## 2017-07-27 RX ADMIN — MORPHINE SULFATE 2 MILLIGRAM(S): 50 CAPSULE, EXTENDED RELEASE ORAL at 14:40

## 2017-07-27 RX ADMIN — Medication 15 MILLIGRAM(S): at 23:51

## 2017-07-27 NOTE — PROGRESS NOTE ADULT - SUBJECTIVE AND OBJECTIVE BOX
Patient is a 36y old  Female who presents with a chief complaint of Abdominal pain, nausea, vomiting x 2 this am (2017 14:13)      INTERVAL HPI/OVERNIGHT EVENTS: Pt S+E at bedside. Resting comfortably. Pt had one episode of vomiting this morning after receiving IV reglan. Pt c/o gassy abd pain and bloating. Denies any bowel movements, passing minimal gas. Denies fever/chills, CP, SOB. Pt going for GI series this morning.     MEDICATIONS  (STANDING):  dextrose 5% + sodium chloride 0.9%. 1000 milliLiter(s) (100 mL/Hr) IV Continuous <Continuous>  enoxaparin Injectable 40 milliGRAM(s) SubCutaneous every 24 hours  pantoprazole  Injectable 40 milliGRAM(s) IV Push daily  metoclopramide Injectable 5 milliGRAM(s) IV Push every 8 hours    MEDICATIONS  (PRN):  morphine  - Injectable 2 milliGRAM(s) IV Push every 4 hours PRN Severe Pain (7 - 10)  acetaminophen   Tablet. 650 milliGRAM(s) Oral every 6 hours PRN Mild Pain (1 - 3)  ketorolac   Injectable 15 milliGRAM(s) IV Push every 6 hours PRN Moderate Pain (4 - 6)      Allergies    narcotic analgesics (Unknown)    Intolerances        REVIEW OF SYSTEMS:  CONSTITUTIONAL: No fever or chills  HEENT:  No headache, no sore throat  RESPIRATORY: No cough, wheezing, or shortness of breath  CARDIOVASCULAR: No chest pain, palpitations, or leg swelling  GASTROINTESTINAL: + N/V, +gas pain, no diarrhea  GENITOURINARY: No dysuria, frequency, or hematuria  NEUROLOGICAL: no focal weakness or dizziness  SKIN:  No rashes or lesions   MUSCULOSKELETAL: no myalgias   PSYCHIATRIC: No depression or anxiety  ROS Unable to obtain due to - [ ] Dementia  [ ] Lethargy  REST OF REVIEW Of SYSTEM - [x] Normal     Vital Signs Last 24 Hrs  T(C): 36.8 (2017 05:01), Max: 37.4 (2017 19:37)  T(F): 98.3 (2017 05:01), Max: 99.4 (2017 19:37)  HR: 69 (2017 05:01) (60 - 69)  BP: 104/70 (2017 05:01) (103/62 - 105/69)  BP(mean): --  RR: 16 (2017 05:01) (16 - 16)  SpO2: 96% (2017 05:01) (96% - 100%)    PHYSICAL EXAM:  GENERAL: No Acute Distress  HEENT:  EOMI, mucous membranes moist  CHEST/LUNG:  Clear To Auscultation b/l, no rales, wheezes, or rhonchi  HEART:  Regular Rate Rhythm, S1, S2, [-]murmur  ABDOMEN:  Bowel Sounds+, soft, Mild TTP, Non-Distended  EXTREMITIES: no edema or calf tenderness  SKIN:  no rash  NERVOUS SYSTEM: AA&Ox3    DIET:     LABS:    CBC Full  -  ( 2017 06:45 )  WBC Count : 11.1 K/uL  Hemoglobin : 11.8 g/dL  Hematocrit : 36.4 %  Platelet Count - Automated : 268 K/uL  Mean Cell Volume : 86.2 fl  Mean Cell Hemoglobin : 27.8 pg  Mean Cell Hemoglobin Concentration : 32.3 gm/dL  Auto Neutrophil # : x  Auto Lymphocyte # : x  Auto Monocyte # : x  Auto Eosinophil # : x  Auto Basophil # : x  Auto Neutrophil % : x  Auto Lymphocyte % : x  Auto Monocyte % : x  Auto Eosinophil % : x  Auto Basophil % : x    2017 06:38    140    |  106    |  5      ----------------------------<  121    3.6     |  28     |  0.50     Ca    8.1        2017 06:38      PT/INR - ( 2017 17:46 )   PT: 11.7 sec;   INR: 1.07 ratio           Urinalysis Basic - ( 2017 13:58 )    Color: Yellow / Appearance: Clear / S.005 / pH: x  Gluc: x / Ketone: Negative  / Bili: Negative / Urobili: Negative   Blood: x / Protein: Negative / Nitrite: Negative   Leuk Esterase: Negative / RBC: x / WBC x   Sq Epi: x / Non Sq Epi: x / Bacteria: x      CAPILLARY BLOOD GLUCOSE          RADIOLOGY & ADDITIONAL TESTS:    Personally reviewed.     Consultant(s) Notes Reviewed:  [x] YES  [ ] NO    Care Discussed with [ ] Consultants  [x] Patient  [ ] Family  [x]      [ ] Other; RN  DVT ppx  Advanced directive Patient is a 36y old  Female who presents with a chief complaint of Abdominal pain, nausea, vomiting x 2 this am (2017 14:13)      INTERVAL HPI/OVERNIGHT EVENTS: Pt S+E at bedside. Resting comfortably. Pt had one episode of vomiting this morning after receiving IV reglan. Pt c/o gassy abd pain and bloating. Denies any bowel movements, passing minimal gas. Denies fever/chills, CP, SOB. Pt going for GI series this morning.     MEDICATIONS  (STANDING):  dextrose 5% + sodium chloride 0.9%. 1000 milliLiter(s) (100 mL/Hr) IV Continuous <Continuous>  enoxaparin Injectable 40 milliGRAM(s) SubCutaneous every 24 hours  pantoprazole  Injectable 40 milliGRAM(s) IV Push daily  metoclopramide Injectable 5 milliGRAM(s) IV Push every 8 hours    MEDICATIONS  (PRN):  morphine  - Injectable 2 milliGRAM(s) IV Push every 4 hours PRN Severe Pain (7 - 10)  acetaminophen   Tablet. 650 milliGRAM(s) Oral every 6 hours PRN Mild Pain (1 - 3)  ketorolac   Injectable 15 milliGRAM(s) IV Push every 6 hours PRN Moderate Pain (4 - 6)      Allergies    narcotic analgesics (Unknown)    Intolerances        REVIEW OF SYSTEMS:  CONSTITUTIONAL: No fever or chills  HEENT:  No headache, no sore throat  RESPIRATORY: No cough, wheezing, or shortness of breath  CARDIOVASCULAR: No chest pain, palpitations, or leg swelling  GASTROINTESTINAL: + N/V, +gas pain, no diarrhea  GENITOURINARY: No dysuria, frequency, or hematuria  NEUROLOGICAL: no focal weakness or dizziness  SKIN:  No rashes or lesions   MUSCULOSKELETAL: no myalgias   PSYCHIATRIC: No depression or anxiety  ROS Unable to obtain due to - [ ] Dementia  [ ] Lethargy  REST OF REVIEW Of SYSTEM - [x] Normal     Vital Signs Last 24 Hrs  T(C): 36.8 (2017 05:01), Max: 37.4 (2017 19:37)  T(F): 98.3 (2017 05:01), Max: 99.4 (2017 19:37)  HR: 69 (2017 05:01) (60 - 69)  BP: 104/70 (2017 05:01) (103/62 - 105/69)  BP(mean): --  RR: 16 (2017 05:01) (16 - 16)  SpO2: 96% (2017 05:01) (96% - 100%)    PHYSICAL EXAM:  GENERAL: mild  Distress  HEENT:  EOMI, mucous membranes moist  CHEST/LUNG:  Clear To Auscultation b/l, no rales, wheezes, or rhonchi  HEART:  Regular Rate Rhythm, S1, S2, [-]murmur  ABDOMEN:  Bowel Sounds+, soft, Mild to mod TTP, Non-Distended  EXTREMITIES: no edema or calf tenderness  SKIN:  no rash  NERVOUS SYSTEM: AA&Ox3    DIET:     LABS:    CBC Full  -  ( 2017 06:45 )  WBC Count : 11.1 K/uL  Hemoglobin : 11.8 g/dL  Hematocrit : 36.4 %  Platelet Count - Automated : 268 K/uL  Mean Cell Volume : 86.2 fl  Mean Cell Hemoglobin : 27.8 pg  Mean Cell Hemoglobin Concentration : 32.3 gm/dL  Auto Neutrophil # : x  Auto Lymphocyte # : x  Auto Monocyte # : x  Auto Eosinophil # : x  Auto Basophil # : x  Auto Neutrophil % : x  Auto Lymphocyte % : x  Auto Monocyte % : x  Auto Eosinophil % : x  Auto Basophil % : x    2017 06:38    140    |  106    |  5      ----------------------------<  121    3.6     |  28     |  0.50     Ca    8.1        2017 06:38      PT/INR - ( 2017 17:46 )   PT: 11.7 sec;   INR: 1.07 ratio           Urinalysis Basic - ( 2017 13:58 )    Color: Yellow / Appearance: Clear / S.005 / pH: x  Gluc: x / Ketone: Negative  / Bili: Negative / Urobili: Negative   Blood: x / Protein: Negative / Nitrite: Negative   Leuk Esterase: Negative / RBC: x / WBC x   Sq Epi: x / Non Sq Epi: x / Bacteria: x      CAPILLARY BLOOD GLUCOSE          RADIOLOGY & ADDITIONAL TESTS:    Personally reviewed.     Consultant(s) Notes Reviewed:  [x] YES  [ ] NO    Care Discussed with [ ] Consultants  [x] Patient  [ ] Family  [x]      [ ] Other; RN  DVT ppx  Advanced directive

## 2017-07-27 NOTE — CONSULT NOTE ADULT - SUBJECTIVE AND OBJECTIVE BOX
Chief Complaint:  Patient is a 36y old  Female who presents with a chief complaint of Abdominal pain, nausea, vomiting x 2 this am (2017 14:13)      HPI: 37 yo f with abdominal pain/n/v found to have a partial SBO. Patient states she has vomited 15 times day. Able to pass flatulence, 2 small caliber BMs. Denies fevers, chills, blood in stools.     Allergies:  narcotic analgesics (Unknown)      Medications:  dextrose 5% + sodium chloride 0.9%. 1000 milliLiter(s) IV Continuous <Continuous>  morphine  - Injectable 2 milliGRAM(s) IV Push every 4 hours PRN  enoxaparin Injectable 40 milliGRAM(s) SubCutaneous every 24 hours  acetaminophen   Tablet. 650 milliGRAM(s) Oral every 6 hours PRN  pantoprazole  Injectable 40 milliGRAM(s) IV Push daily  ketorolac   Injectable 15 milliGRAM(s) IV Push every 6 hours PRN  metoclopramide Injectable 5 milliGRAM(s) IV Push every 8 hours      PMHX/PSHX:  No pertinent past medical history  S/P  section      Family history:  No pertinent family history in first degree relatives      Social History: denies etoh, tobacco, drug use    ROS:     General:  No wt loss, fevers, chills, night sweats, fatigue,   Eyes:  Good vision, no reported pain  ENT:  No sore throat, pain, runny nose, dysphagia  CV:  No pain, palpitations, hypo/hypertension  Resp:  No dyspnea, cough, tachypnea, wheezing  GI:  abdominal pain, n/v  :  No pain, bleeding, incontinence, nocturia  Muscle:  No pain, weakness  Neuro:  No weakness, tingling, memory problems  Psych:  No fatigue, insomnia, mood problems, depression  Endocrine:  No polyuria, polydipsia, cold/heat intolerance  Heme:  No petechiae, ecchymosis, easy bruisability  Skin:  No rash, tattoos, scars, edema      PHYSICAL EXAM:   Vital Signs:  Vital Signs Last 24 Hrs  T(C): 36.7 (2017 13:26), Max: 37.4 (2017 19:37)  T(F): 98.1 (2017 13:26), Max: 99.4 (2017 19:37)  HR: 64 (2017 13:26) (60 - 69)  BP: 111/75 (2017 13:26) (104/70 - 111/75)  BP(mean): --  RR: 17 (2017 13:26) (16 - 17)  SpO2: 100% (2017 13:26) (96% - 100%)  Daily     Daily     GENERAL:  Appears stated age, well-groomed, well-nourished, no distress  HEENT:  NC/AT,  conjunctivae clear and pink, no thyromegaly, nodules, adenopathy, no JVD, sclera -anicteric  CHEST:  Full & symmetric excursion, no increased effort, breath sounds clear  HEART:  Regular rhythm, S1, S2, no murmur/rub/S3/S4, no abdominal bruit, no edema  ABDOMEN:  Soft, mild diffuse tenderness, non-distended, normoactive bowel sounds,  no masses ,no hepato-splenomegaly, no signs of chronic liver disease  EXTEREMITIES:  no cyanosis,clubbing or edema  SKIN:  No rash/erythema/ecchymoses/petechiae/wounds/abscess/warm/dry  NEURO:  Alert, oriented, no asterixis, no tremor, no encephalopathy    LABS:                        13.4   9.2   )-----------( 288      ( 2017 06:38 )             40.8     07-27    140  |  106  |  5<L>  ----------------------------<  121<H>  3.6   |  28  |  0.50    Ca    8.1<L>      2017 06:38  Phos  2.1     -  Mg     1.9     -26        PT/INR - ( 2017 17:46 )   PT: 11.7 sec;   INR: 1.07 ratio                 Imaging:

## 2017-07-27 NOTE — PROGRESS NOTE ADULT - SUBJECTIVE AND OBJECTIVE BOX
Pt seen and examined.  Still with distention and nausea with multiple episodes of vomiting, for which i was never notified.  I had been following her GI series throughout the day and have found no significant improvement or passage of contrast through SB.    Vital Signs Last 24 Hrs  T(C): 36.7 (27 Jul 2017 13:26), Max: 37.4 (26 Jul 2017 19:37)  T(F): 98.1 (27 Jul 2017 13:26), Max: 99.4 (26 Jul 2017 19:37)  HR: 64 (27 Jul 2017 13:26) (60 - 69)  BP: 111/75 (27 Jul 2017 13:26) (104/70 - 111/75)  BP(mean): --  RR: 17 (27 Jul 2017 13:26) (16 - 17)  SpO2: 100% (27 Jul 2017 13:26) (96% - 100%)    07-26 @ 07:01  -  07-27 @ 07:00  --------------------------------------------------------  IN: 2150 mL / OUT: 270 mL / NET: 1880 mL    07-27 @ 07:01 - 07-27 @ 18:12  --------------------------------------------------------  IN: 700 mL / OUT: 200 mL / NET: 500 mL                              13.4   9.2   )-----------( 288      ( 27 Jul 2017 06:38 )             40.8     07-27    140  |  106  |  5<L>  ----------------------------<  121<H>  3.6   |  28  |  0.50    Ca    8.1<L>      27 Jul 2017 06:38  Phos  2.1     07-26  Mg     1.9     07-26        Physical Exam:  Awake alert and oriented x3 in no acute distress. NCAT, PERRLA, EOMI  Lungs clear bilaterally without wheezes rhonchi or rales.  Regular rate and rhythm  Abdomen soft, mildly tender, distended, positive bowel sounds in all 4 quadrants. No evidence of hernia or masses.   Extremities without edema or tenderness.    EXAM:  GI AND SMALL BOWEL W GASTRO                            PROCEDURE DATE:  07/27/2017          INTERPRETATION:  Date of study: 7/27/17.    Prior: 7/25/17 CT scan of abd-pelvis; had plain abdominal films done    7/26/17.    CLINICAL INDICATION: 36-yo-female patient with crampy abdominal pain -   rule out SBO.    UG/small bowel series:    film shows cholecystectomy clips in the right upper quadrant.  Patient was given Gastroview - several films taken - the patient vomited   up the contrast.  Patient then given barium to drink.  The stomach is distended with contrast. There is some delay in emptying   of the stomach.  There is contrast distention of duodenum and jejunal loops - consistent   with low-grade or partial small bowel obstruction. Even the barium is   diluted - so by the 5PM film (6hr delay) no contrast filling of the colon   is seen.      Impression: Findings consistent with low-grade or partial small bowel   obstruction - dilution of contrast in the small bowel makes it difficult   for contrast column to be visualized. Findings are similar to that seen   on CT scan and 7/26/17 plain films. Recommend followup plain films in AM   of 7/28/17.  Cross sectional imaging can be done as needed.

## 2017-07-27 NOTE — PROGRESS NOTE ADULT - ASSESSMENT
SBO. GI series with no significant improvement.   Will arrange for Diagnostic Laparoscopy, Possible open with possible bowel resection.

## 2017-07-27 NOTE — BRIEF OPERATIVE NOTE - PROCEDURE
Laparoscopic reduction of internal hernia  07/27/2017    Active  [id number]  Lysis of adhesions  07/27/2017    Active  [id number]  Diagnostic laparoscopy by general surgery  07/27/2017    Active  [id number]

## 2017-07-27 NOTE — CONSULT NOTE ADULT - PROBLEM SELECTOR RECOMMENDATION 9
partial SBO -- s/p upper gi series with no contrast in colon  npo, serial abdominal exams  f/u surgery recs  monitor lytres -- k>4, mg>2
Possible early small bowel obstruction however air and stool in colon suggest partial nature.  This could be related to previous surgery ( or Lap Lula) as transition suggested near umbilicus.  Bowel loops however are not significantly dilated with air fluid levels and therefore may simply represent ileus.  In conjunction with duration of symptoms and presentation this sounds more like flu like symptoms.  Will monitor closely and obtain Abdominal Xrays (flat and upright) in AM to assess for progression or resolution of "obstruction".  If symptoms persist, fail to improve or worsen, Operative exploration (Laparoscopy vs Laparotomy) may be indicated.  Will follow.

## 2017-07-27 NOTE — CONSULT NOTE ADULT - ASSESSMENT
36 year old female with partial SBO
36 year old woman with leukocytosis, low grade fever and nonspecific abdominal pain with nausea x 2 days.  One episode vomiting.  No sick contacts or recent travel.  Presented with generalized weakness, epigastric abdominal pain radiating to chest and back.

## 2017-07-27 NOTE — CONSULT NOTE ADULT - PROBLEM SELECTOR RECOMMENDATION 2
secondary to partial sbo  zofran 4 mg q 6 standing  IVF hydration  npo
Continue resuscitation.  Repeat CBC in am.

## 2017-07-27 NOTE — PROGRESS NOTE ADULT - PROBLEM SELECTOR PLAN 1
OR for Diagnostic Laparoscopy, Possible Open, Possible Bowel Resection.  Risk, Benefits, and Alternatives to surgery have been discussed.  This includes but is not limited to bleeding, infection, damage to adjacent structures, need for additional surgery or interventions, adverse effects of anesthesia such as cardio-respiratory complications, prolonged intubation, cardiac arrhythmia, arrest, and or death.  Risks of forgoing surgery have also been discussed including progression of, and/or worsening of current condition which may then require urgent or emergent treatment or surgery.  Informed consent obtained.

## 2017-07-28 ENCOUNTER — TRANSCRIPTION ENCOUNTER (OUTPATIENT)
Age: 37
End: 2017-07-28

## 2017-07-28 VITALS — DIASTOLIC BLOOD PRESSURE: 58 MMHG | SYSTOLIC BLOOD PRESSURE: 94 MMHG

## 2017-07-28 LAB
ANION GAP SERPL CALC-SCNC: 10 MMOL/L — SIGNIFICANT CHANGE UP (ref 5–17)
BASOPHILS # BLD AUTO: 0.1 K/UL — SIGNIFICANT CHANGE UP (ref 0–0.2)
BASOPHILS NFR BLD AUTO: 0.6 % — SIGNIFICANT CHANGE UP (ref 0–2)
BUN SERPL-MCNC: 10 MG/DL — SIGNIFICANT CHANGE UP (ref 7–23)
CALCIUM SERPL-MCNC: 7.8 MG/DL — LOW (ref 8.5–10.1)
CHLORIDE SERPL-SCNC: 107 MMOL/L — SIGNIFICANT CHANGE UP (ref 96–108)
CO2 SERPL-SCNC: 25 MMOL/L — SIGNIFICANT CHANGE UP (ref 22–31)
CREAT SERPL-MCNC: 0.37 MG/DL — LOW (ref 0.5–1.3)
EOSINOPHIL # BLD AUTO: 0 K/UL — SIGNIFICANT CHANGE UP (ref 0–0.5)
EOSINOPHIL NFR BLD AUTO: 0.3 % — SIGNIFICANT CHANGE UP (ref 0–6)
GLUCOSE SERPL-MCNC: 98 MG/DL — SIGNIFICANT CHANGE UP (ref 70–99)
HCT VFR BLD CALC: 34.4 % — LOW (ref 34.5–45)
HGB BLD-MCNC: 11.3 G/DL — LOW (ref 11.5–15.5)
LYMPHOCYTES # BLD AUTO: 1.6 K/UL — SIGNIFICANT CHANGE UP (ref 1–3.3)
LYMPHOCYTES # BLD AUTO: 15.8 % — SIGNIFICANT CHANGE UP (ref 13–44)
MCHC RBC-ENTMCNC: 28.3 PG — SIGNIFICANT CHANGE UP (ref 27–34)
MCHC RBC-ENTMCNC: 32.9 GM/DL — SIGNIFICANT CHANGE UP (ref 32–36)
MCV RBC AUTO: 86.1 FL — SIGNIFICANT CHANGE UP (ref 80–100)
MONOCYTES # BLD AUTO: 0.7 K/UL — SIGNIFICANT CHANGE UP (ref 0–0.9)
MONOCYTES NFR BLD AUTO: 7.3 % — SIGNIFICANT CHANGE UP (ref 1–9)
NEUTROPHILS # BLD AUTO: 7.7 K/UL — HIGH (ref 1.8–7.4)
NEUTROPHILS NFR BLD AUTO: 76 % — SIGNIFICANT CHANGE UP (ref 43–77)
PLATELET # BLD AUTO: 255 K/UL — SIGNIFICANT CHANGE UP (ref 150–400)
POTASSIUM SERPL-MCNC: 3.7 MMOL/L — SIGNIFICANT CHANGE UP (ref 3.5–5.3)
POTASSIUM SERPL-SCNC: 3.7 MMOL/L — SIGNIFICANT CHANGE UP (ref 3.5–5.3)
RBC # BLD: 4 M/UL — SIGNIFICANT CHANGE UP (ref 3.8–5.2)
RBC # FLD: 11.7 % — SIGNIFICANT CHANGE UP (ref 10.3–14.5)
SODIUM SERPL-SCNC: 142 MMOL/L — SIGNIFICANT CHANGE UP (ref 135–145)
WBC # BLD: 10.1 K/UL — SIGNIFICANT CHANGE UP (ref 3.8–10.5)
WBC # FLD AUTO: 10.1 K/UL — SIGNIFICANT CHANGE UP (ref 3.8–10.5)

## 2017-07-28 PROCEDURE — 74020: CPT

## 2017-07-28 PROCEDURE — 86850 RBC ANTIBODY SCREEN: CPT

## 2017-07-28 PROCEDURE — 99285 EMERGENCY DEPT VISIT HI MDM: CPT | Mod: 25

## 2017-07-28 PROCEDURE — 85610 PROTHROMBIN TIME: CPT

## 2017-07-28 PROCEDURE — 86900 BLOOD TYPING SEROLOGIC ABO: CPT

## 2017-07-28 PROCEDURE — 74000: CPT | Mod: 26

## 2017-07-28 PROCEDURE — 80048 BASIC METABOLIC PNL TOTAL CA: CPT

## 2017-07-28 PROCEDURE — 71046 X-RAY EXAM CHEST 2 VIEWS: CPT

## 2017-07-28 PROCEDURE — 87086 URINE CULTURE/COLONY COUNT: CPT

## 2017-07-28 PROCEDURE — 84100 ASSAY OF PHOSPHORUS: CPT

## 2017-07-28 PROCEDURE — 81003 URINALYSIS AUTO W/O SCOPE: CPT

## 2017-07-28 PROCEDURE — 83735 ASSAY OF MAGNESIUM: CPT

## 2017-07-28 PROCEDURE — 84145 PROCALCITONIN (PCT): CPT

## 2017-07-28 PROCEDURE — 96374 THER/PROPH/DIAG INJ IV PUSH: CPT

## 2017-07-28 PROCEDURE — 74245: CPT

## 2017-07-28 PROCEDURE — 93005 ELECTROCARDIOGRAM TRACING: CPT

## 2017-07-28 PROCEDURE — 96375 TX/PRO/DX INJ NEW DRUG ADDON: CPT

## 2017-07-28 PROCEDURE — 83605 ASSAY OF LACTIC ACID: CPT

## 2017-07-28 PROCEDURE — 96376 TX/PRO/DX INJ SAME DRUG ADON: CPT

## 2017-07-28 PROCEDURE — 83690 ASSAY OF LIPASE: CPT

## 2017-07-28 PROCEDURE — 71275 CT ANGIOGRAPHY CHEST: CPT

## 2017-07-28 PROCEDURE — 74177 CT ABD & PELVIS W/CONTRAST: CPT

## 2017-07-28 PROCEDURE — 85027 COMPLETE CBC AUTOMATED: CPT

## 2017-07-28 PROCEDURE — 99024 POSTOP FOLLOW-UP VISIT: CPT

## 2017-07-28 PROCEDURE — 84702 CHORIONIC GONADOTROPIN TEST: CPT

## 2017-07-28 PROCEDURE — 86901 BLOOD TYPING SEROLOGIC RH(D): CPT

## 2017-07-28 PROCEDURE — 96372 THER/PROPH/DIAG INJ SC/IM: CPT | Mod: XU

## 2017-07-28 PROCEDURE — 99233 SBSQ HOSP IP/OBS HIGH 50: CPT

## 2017-07-28 PROCEDURE — 80053 COMPREHEN METABOLIC PANEL: CPT

## 2017-07-28 PROCEDURE — 82150 ASSAY OF AMYLASE: CPT

## 2017-07-28 PROCEDURE — 74018 RADEX ABDOMEN 1 VIEW: CPT

## 2017-07-28 PROCEDURE — 85730 THROMBOPLASTIN TIME PARTIAL: CPT

## 2017-07-28 RX ORDER — IBUPROFEN 200 MG
1 TABLET ORAL
Qty: 30 | Refills: 0 | OUTPATIENT
Start: 2017-07-28

## 2017-07-28 RX ADMIN — Medication 15 MILLIGRAM(S): at 15:55

## 2017-07-28 RX ADMIN — PANTOPRAZOLE SODIUM 40 MILLIGRAM(S): 20 TABLET, DELAYED RELEASE ORAL at 12:10

## 2017-07-28 RX ADMIN — Medication 15 MILLIGRAM(S): at 06:12

## 2017-07-28 RX ADMIN — Medication 15 MILLIGRAM(S): at 14:29

## 2017-07-28 RX ADMIN — SODIUM CHLORIDE 150 MILLILITER(S): 9 INJECTION, SOLUTION INTRAVENOUS at 08:34

## 2017-07-28 RX ADMIN — Medication 15 MILLIGRAM(S): at 00:30

## 2017-07-28 NOTE — DISCHARGE NOTE ADULT - NS AS ACTIVITY OBS
Return to Work/School allowed/Stairs allowed/Showering allowed/Walking-Outdoors allowed/No Heavy lifting/straining/Walking-Indoors allowed/Driving allowed

## 2017-07-28 NOTE — PROGRESS NOTE ADULT - ASSESSMENT
37 yo F with PSHx of 3 C-Sections and cholecystectomy admitted for SBO now s/p laparoscopic reduction of internal hernia and VIVIANE, POD #1.

## 2017-07-28 NOTE — PROGRESS NOTE ADULT - SUBJECTIVE AND OBJECTIVE BOX
The patient was interviewed and evaluated  36y Female    T(C): 36.8 (07-28-17 @ 07:42), Max: 37.7 (07-27-17 @ 22:35)  HR: 71 (07-28-17 @ 07:42) (62 - 90)  BP: 103/73 (07-28-17 @ 07:42) (103/73 - 135/76)  RR: 16 (07-28-17 @ 07:42) (12 - 19)  SpO2: 95% (07-28-17 @ 07:42) (95% - 100%)  Wt(kg): --    Pt seen, doing well, no anesthesia complications or complaints noted or reported.   No Nausea    No additional recommendations.     Pain well controlled

## 2017-07-28 NOTE — DISCHARGE NOTE ADULT - CARE PROVIDER_API CALL
Espinoza Capone), Surgery  [address] [place] [address]  Phone: [phone]  Fax: [phone]

## 2017-07-28 NOTE — PROGRESS NOTE ADULT - PROBLEM SELECTOR PROBLEM 1
SBO (small bowel obstruction)
SBO (small bowel obstruction)
Partial small bowel obstruction
SBO (small bowel obstruction)

## 2017-07-28 NOTE — DISCHARGE NOTE ADULT - HOSPITAL COURSE
Admitted with nonspecific epigastric and back pain with low grade temp and vomiting.  Imaging suggesive of partial SBO.  Follow up and GI series failed to show resolution of obstruction so pt was taken to OR for Diagnostic Laparoscopy.  Found to have omental adhesions to abdominal wall as well as internal herniation of SB through greater omentum.  Adhesiolyis performed and internal hernia reduced.  Pt tolerated the procedure well.  Post operative course unremarkable with near immediate relief of symptoms.  Diet advanced and tolerated.  To be discharged home today.

## 2017-07-28 NOTE — PROGRESS NOTE ADULT - SUBJECTIVE AND OBJECTIVE BOX
POD # 1 s/p Diagnostic laparoscopy with lysis of adhesions and reduction of internal hernia for High Grade SBO.  Pt comfortable, no longer complaining of distention, bloat, nausea or vomiting.  Reports + flatus.      Vital Signs Last 24 Hrs  T(C): 36.8 (28 Jul 2017 07:42), Max: 37.7 (27 Jul 2017 22:35)  T(F): 98.2 (28 Jul 2017 07:42), Max: 99.8 (27 Jul 2017 22:35)  HR: 71 (28 Jul 2017 07:42) (62 - 90)  BP: 103/73 (28 Jul 2017 07:42) (103/73 - 135/76)  BP(mean): --  RR: 16 (28 Jul 2017 07:42) (12 - 19)  SpO2: 95% (28 Jul 2017 07:42) (95% - 100%)    07-27 @ 07:01  -  07-28 @ 07:00  --------------------------------------------------------  IN: 2470 mL / OUT: 600 mL / NET: 1870 mL    07-28 @ 07:01  -  07-28 @ 12:06  --------------------------------------------------------  IN: 600 mL / OUT: 500 mL / NET: 100 mL                              11.3   10.1  )-----------( 255      ( 28 Jul 2017 06:01 )             34.4     07-28    142  |  107  |  10  ----------------------------<  98  3.7   |  25  |  0.37<L>    Ca    7.8<L>      28 Jul 2017 06:01        Physical Exam:  Awake alert and oriented x3 in no acute distress. NCAT, PERRLA, EOMI  Lungs clear bilaterally without wheezes rhonchi or rales.  Regular rate and rhythm  Abdomen soft, nontender, nondistended, positive bowel sounds in all 4 quadrants. No evidence of hernia or masses. Surgical incisions remained well approximated and healing appropriately without erythema, induration or fluctuance. Dressings remained clean dry and intact  Extremities without edema or tenderness.    EXAM:  PORTABLE ABDOMEN                            PROCEDURE DATE:  07/28/2017          INTERPRETATION:  CLINICAL INFORMATION: Postop small bowel obstruction.    TECHNIQUE: Single supine portable view of the abdomen was obtained.    COMPARISON:  view from a small bowel series dated 7/27/2017.    FINDINGS:     There are surgical clips in the right upper quadrant. There has interval   transit of oral contrast into the colon. There are no dilated small or   large bowel loops.     IMPRESSION:     Nonobstructive bowel gas pattern.

## 2017-07-28 NOTE — PROGRESS NOTE ADULT - ASSESSMENT
POD 1 s/p Laparoscopic lysis of adhesions and reduction of internal hernia for SBO.  Symptoms completely resolved.  Follow up imaging shows contrast successfully traversed SB to ascending colon.  Feels well.

## 2017-07-28 NOTE — PROGRESS NOTE ADULT - PROBLEM SELECTOR PLAN 1
s/p laparoscopic reduction of internal hernia and VIVIANE, POD #1  abd x-ray this morning non-obstructive bowel gas pattern  pt passing flatus well and symptoms much improved   if able to tolerate diet, pt will be discharged as per surgery

## 2017-07-28 NOTE — PROGRESS NOTE ADULT - SUBJECTIVE AND OBJECTIVE BOX
Interval events: Pt feeling well, mild pain at surgical incision sites. + flatulence      HPI:35 yo f with abdominal pain/n/v found to have a partial SBO. Patient states she has vomited 15 times day. Able to pass flatulence, 2 small caliber BMs. Denies fevers, chills, blood in stools.     MEDICATIONS  (STANDING):  enoxaparin Injectable 40 milliGRAM(s) SubCutaneous every 24 hours  pantoprazole  Injectable 40 milliGRAM(s) IV Push daily    MEDICATIONS  (PRN):  morphine  - Injectable 2 milliGRAM(s) IV Push every 4 hours PRN Severe Pain (7 - 10)  acetaminophen   Tablet. 650 milliGRAM(s) Oral every 6 hours PRN Mild Pain (1 - 3)  ondansetron Injectable 4 milliGRAM(s) IV Push every 6 hours PRN Nausea  metoclopramide Injectable 5 milliGRAM(s) IV Push every 8 hours PRN nausea      Allergies    narcotic analgesics (Unknown)    Intolerances        Review of Systems:    General:  No wt loss, fevers, chills, night sweats,fatigue,   Eyes:  Good vision, no reported pain  ENT:  No sore throat, pain, runny nose, dysphagia  CV:  No pain, palpitations, hypo/hypertension  Resp:  No dyspnea, cough, tachypnea, wheezing  GI:  mild abdominal pain at surgical incision sights, +gas No rectal bleeding, No melena, No dysphagia  :  No pain, bleeding, incontinence, nocturia  Muscle:  No pain, weakness  Neuro:  No weakness, tingling, memory problems  Psych:  No fatigue, insomnia, mood problems, depression  Endocrine:  No polyuria, polydypsia, cold/heat intolerance  Heme:  No petechiae, ecchymosis, easy bruisability  Skin:  No rash, tattoos, scars, edema      Vital Signs Last 24 Hrs  T(C): 36.8 (28 Jul 2017 07:42), Max: 37.7 (27 Jul 2017 22:35)  T(F): 98.2 (28 Jul 2017 07:42), Max: 99.8 (27 Jul 2017 22:35)  HR: 71 (28 Jul 2017 07:42) (62 - 90)  BP: 103/73 (28 Jul 2017 07:42) (103/73 - 135/76)  BP(mean): --  RR: 16 (28 Jul 2017 07:42) (12 - 19)  SpO2: 95% (28 Jul 2017 07:42) (95% - 100%)    PHYSICAL EXAM:    Constitutional: NAD, well-developed  HEENT: EOMI, throat clear  Neck: No LAD, supple  Respiratory: CTA and P  Cardiovascular: S1 and S2, RRR, no M  Gastrointestinal: BS+, soft, NT/ND, neg HSM,  Extremities: No peripheral edema, neg clubing, cyanosis  Vascular: 2+ peripheral pulses  Neurological: A/O x 3, no focal deficits  Psychiatric: Normal mood, normal affect  Skin: No rashes      LABS:                        11.3   10.1  )-----------( 255      ( 28 Jul 2017 06:01 )             34.4     07-28    142  |  107  |  10  ----------------------------<  98  3.7   |  25  |  0.37<L>    Ca    7.8<L>      28 Jul 2017 06:01      PT/INR - ( 26 Jul 2017 17:46 )   PT: 11.7 sec;   INR: 1.07 ratio               RADIOLOGY & ADDITIONAL TESTS:

## 2017-07-28 NOTE — PROGRESS NOTE ADULT - PROBLEM SELECTOR PLAN 3
Resolved, likely musculoskeletal
Resolved, likely 2/2 decreased PO intake  Will continue to monitor
Repleted today, likely 2/2 decreased PO intake  Will cont to monitor

## 2017-07-28 NOTE — PROGRESS NOTE ADULT - PROBLEM SELECTOR PLAN 1
s/p Laparoscopic lysis of adhesions and reduction of internal hernia for SBO.  Symptoms completely resolved.  Follow up imaging shows contrast successfully traversed SB to ascending colon.  Feels well.  Outpatient surgery follow up

## 2017-07-28 NOTE — PROGRESS NOTE ADULT - ATTENDING COMMENTS
37 y/o F with PSHx of 3 C-Sections and cholecystectomy admitted for partial  SBO. Plan: follow up gi series, late evening patient was taken urgently to OR for surgical intervention, apprec gen surg recs, postop care, apprec GI recs as well, monitor clinical course
Note written by attending, see above.
35 y/o F with PSHx of 3 c-sections and cholecystectomy admitted for partial  SBO. Plan: replete electrolytes hypophosphatemia and hypokalemia, pain control, apprec gen surg recs, cont to monitor clinically, antiemetics for nausea

## 2017-07-28 NOTE — PROGRESS NOTE ADULT - PROBLEM SELECTOR PLAN 2
Resolved, will cont to monitor
Resolved  Likely musculoskeletal
Resolved, will cont to monitor
Resolved

## 2017-07-28 NOTE — DISCHARGE NOTE ADULT - MEDICATION SUMMARY - MEDICATIONS TO TAKE
I will START or STAY ON the medications listed below when I get home from the hospital:    Advil 200 mg oral tablet  -- 1-2  tab(s) by mouth every 6 hours PRN pain MDD:8  -- Do not take this drug if you are pregnant.  It is very important that you take or use this exactly as directed.  Do not skip doses or discontinue unless directed by your doctor.  May cause drowsiness or dizziness.  Obtain medical advice before taking any non-prescription drugs as some may affect the action of this medication.  Take with food or milk.    -- Indication: For Pain

## 2017-07-28 NOTE — DISCHARGE NOTE ADULT - PLAN OF CARE
Recover from surgery Diet as tolerated.  Postoperative instructions have been provided including avoidance of heavy lifting and strenuous activities in excess of 20-25 pounds for duration of 4-6 weeks. The patient may shower keeping the incisions clean, dry, covered as needed.

## 2017-07-28 NOTE — DISCHARGE NOTE ADULT - PATIENT PORTAL LINK FT
“You can access the [employer] Patient Portal, offered by [facility], by registering with the following website: [email or web address]”

## 2017-07-28 NOTE — DISCHARGE NOTE ADULT - CARE PLAN
Principal Discharge DX:	SBO (small bowel obstruction)  Goal:	Recover from surgery  Instructions for follow-up, activity and diet:	Diet as tolerated.  Postoperative instructions have been provided including avoidance of heavy lifting and strenuous activities in excess of 20-25 pounds for duration of 4-6 weeks. The patient may shower keeping the incisions clean, dry, covered as needed.

## 2017-08-01 ENCOUNTER — APPOINTMENT (OUTPATIENT)
Dept: SURGERY | Facility: CLINIC | Age: 37
End: 2017-08-01
Payer: SELF-PAY

## 2017-08-01 VITALS
HEART RATE: 60 BPM | DIASTOLIC BLOOD PRESSURE: 72 MMHG | BODY MASS INDEX: 22.66 KG/M2 | WEIGHT: 120 LBS | HEIGHT: 61 IN | SYSTOLIC BLOOD PRESSURE: 108 MMHG | TEMPERATURE: 98.1 F

## 2017-08-01 DIAGNOSIS — Z90.49 ACQUIRED ABSENCE OF OTHER SPECIFIED PARTS OF DIGESTIVE TRACT: ICD-10-CM

## 2017-08-01 DIAGNOSIS — R11.2 NAUSEA WITH VOMITING, UNSPECIFIED: ICD-10-CM

## 2017-08-01 DIAGNOSIS — K66.0 PERITONEAL ADHESIONS (POSTPROCEDURAL) (POSTINFECTION): ICD-10-CM

## 2017-08-01 DIAGNOSIS — Z78.9 OTHER SPECIFIED HEALTH STATUS: ICD-10-CM

## 2017-08-01 DIAGNOSIS — Z88.5 ALLERGY STATUS TO NARCOTIC AGENT: ICD-10-CM

## 2017-08-01 DIAGNOSIS — E87.6 HYPOKALEMIA: ICD-10-CM

## 2017-08-01 DIAGNOSIS — Z87.19 PERSONAL HISTORY OF OTHER DISEASES OF THE DIGESTIVE SYSTEM: ICD-10-CM

## 2017-08-01 DIAGNOSIS — E83.39 OTHER DISORDERS OF PHOSPHORUS METABOLISM: ICD-10-CM

## 2017-08-01 DIAGNOSIS — K45.0 OTHER SPECIFIED ABDOMINAL HERNIA WITH OBSTRUCTION, WITHOUT GANGRENE: ICD-10-CM

## 2017-08-01 DIAGNOSIS — R07.89 OTHER CHEST PAIN: ICD-10-CM

## 2017-08-01 PROBLEM — Z00.00 ENCOUNTER FOR PREVENTIVE HEALTH EXAMINATION: Status: ACTIVE | Noted: 2017-08-01

## 2017-08-01 PROCEDURE — 99024 POSTOP FOLLOW-UP VISIT: CPT

## 2017-08-01 RX ORDER — AMOXICILLIN 500 MG/1
500 CAPSULE ORAL
Qty: 21 | Refills: 0 | Status: ACTIVE | COMMUNITY
Start: 2017-05-15

## 2017-08-01 RX ORDER — IBUPROFEN 600 MG/1
600 TABLET, FILM COATED ORAL
Qty: 28 | Refills: 0 | Status: ACTIVE | COMMUNITY
Start: 2017-05-15

## 2017-08-03 ENCOUNTER — CHART COPY (OUTPATIENT)
Age: 37
End: 2017-08-03

## 2017-08-05 ENCOUNTER — TRANSCRIPTION ENCOUNTER (OUTPATIENT)
Age: 37
End: 2017-08-05

## 2017-08-24 ENCOUNTER — CHART COPY (OUTPATIENT)
Age: 37
End: 2017-08-24

## 2018-01-14 ENCOUNTER — EMERGENCY (EMERGENCY)
Facility: HOSPITAL | Age: 38
LOS: 1 days | Discharge: HOME | End: 2018-01-14
Attending: INTERNAL MEDICINE | Admitting: INTERNAL MEDICINE
Payer: COMMERCIAL

## 2018-01-14 VITALS
HEART RATE: 79 BPM | OXYGEN SATURATION: 99 % | SYSTOLIC BLOOD PRESSURE: 135 MMHG | WEIGHT: 110.01 LBS | RESPIRATION RATE: 12 BRPM | TEMPERATURE: 98 F | DIASTOLIC BLOOD PRESSURE: 83 MMHG | HEIGHT: 61 IN

## 2018-01-14 VITALS
DIASTOLIC BLOOD PRESSURE: 78 MMHG | HEART RATE: 71 BPM | RESPIRATION RATE: 14 BRPM | TEMPERATURE: 98 F | SYSTOLIC BLOOD PRESSURE: 129 MMHG | OXYGEN SATURATION: 97 %

## 2018-01-14 DIAGNOSIS — Z98.891 HISTORY OF UTERINE SCAR FROM PREVIOUS SURGERY: Chronic | ICD-10-CM

## 2018-01-14 PROCEDURE — 99283 EMERGENCY DEPT VISIT LOW MDM: CPT | Mod: 25

## 2018-01-14 PROCEDURE — 99283 EMERGENCY DEPT VISIT LOW MDM: CPT

## 2018-01-14 RX ORDER — IBUPROFEN 200 MG
1 TABLET ORAL
Qty: 20 | Refills: 0 | OUTPATIENT
Start: 2018-01-14 | End: 2018-01-18

## 2018-01-14 RX ORDER — IBUPROFEN 200 MG
600 TABLET ORAL ONCE
Qty: 0 | Refills: 0 | Status: COMPLETED | OUTPATIENT
Start: 2018-01-14 | End: 2018-01-14

## 2018-01-14 RX ADMIN — Medication 1 TABLET(S): at 02:27

## 2018-01-14 RX ADMIN — Medication 600 MILLIGRAM(S): at 02:27

## 2018-01-14 NOTE — ED PROVIDER NOTE - ENMT, MLM
TM's  B/L injected .  Nasal mucosa clear. Mouth with normal mucosa. Throat has no vesicles, no oropharyngeal exudates and uvula is midline.

## 2018-01-14 NOTE — ED ADULT NURSE NOTE - OBJECTIVE STATEMENT
37 year old female presents to the ED with c/o ear pain, and nasal congestion. A+O x 4. Reports cold s/s and rosales ear pain x 3 days and reports it is now unbearable. Denies any transmission of water, substances, or insects into ear. Right ear red and more painful. SO has cold s/s as well. Tried tylenol but unsuccessful in relieving pain. Will continue to monitor.

## 2018-01-14 NOTE — ED PROVIDER NOTE - CONSTITUTIONAL, MLM
normal... Well appearing, well nourished, awake, alert, oriented to person, place, time/situation and mod  distress.

## 2018-12-04 NOTE — PROGRESS NOTE ADULT - SUBJECTIVE AND OBJECTIVE BOX
Telephone Encounter by Sheryl Duffy at 02/19/18 11:19 AM     Author:  Sheryl Duffy Service:  (none) Author Type:  Patient      Filed:  02/19/18 11:22 AM Encounter Date:  2/13/2018 Status:  Signed     :  Sheryl Duffy (Patient )            Patient called and due to patient having [employer] was advised to contact her insurance for behavioral health coverage.[DB1.1M]       Revision History        User Key Date/Time User Provider Type Action    > DB1.1 02/19/18 11:22 AM Sheryl Duffy Patient  Sign    M - Manual             Patient is a 36y old  Female who presents with a chief complaint of Abdominal pain, nausea, vomiting x 2 this am (28 Jul 2017 12:09)      INTERVAL HPI/OVERNIGHT EVENTS: Pt feels well. Passing flatus well. Mild post-op abdominal discomfort near incisions. Denies fever, chills, nausea, vomiting, SOB.     MEDICATIONS  (STANDING):    MEDICATIONS  (PRN):      Allergies    narcotic analgesics (Unknown)    Intolerances        REVIEW OF SYSTEMS:  CONSTITUTIONAL: No fever or chills  HEENT:  No headache, no sore throat  RESPIRATORY: No cough, wheezing, or shortness of breath  CARDIOVASCULAR: No chest pain, palpitations, or leg swelling  GASTROINTESTINAL: Mild discomfort near incisions. No nausea, vomiting, or diarrhea  GENITOURINARY: No dysuria, frequency, or hematuria  NEUROLOGICAL: no focal weakness or dizziness  SKIN:  No rashes or lesions   MUSCULOSKELETAL: no myalgias   PSYCHIATRIC: No depression or anxiety    Vital Signs Last 24 Hrs  T(C): 37 (28 Jul 2017 16:42), Max: 37 (28 Jul 2017 16:42)  T(F): 98.6 (28 Jul 2017 16:42), Max: 98.6 (28 Jul 2017 16:42)  HR: 68 (28 Jul 2017 16:42) (68 - 71)  BP: 94/58 (28 Jul 2017 16:49) (94/58 - 103/73)  BP(mean): --  RR: 15 (28 Jul 2017 16:42) (15 - 16)  SpO2: 99% (28 Jul 2017 16:42) (95% - 99%)    PHYSICAL EXAM:  GENERAL: NAD  HEENT:  EOMI, moist mucous membranes  CHEST/LUNG:  CTA b/l, no rales, wheezes, or rhonchi  HEART:  RRR, S1, S2  ABDOMEN:  BS+, soft, mild general tenderness without guarding, nondistended  EXTREMITIES: no edema or calf tenderness  SKIN:  no rash  NERVOUS SYSTEM: AA&Ox3    LABS:                        11.3   10.1  )-----------( 255      ( 28 Jul 2017 06:01 )             34.4     CBC Full  -  ( 28 Jul 2017 06:01 )  WBC Count : 10.1 K/uL  Hemoglobin : 11.3 g/dL  Hematocrit : 34.4 %  Platelet Count - Automated : 255 K/uL  Mean Cell Volume : 86.1 fl  Mean Cell Hemoglobin : 28.3 pg  Mean Cell Hemoglobin Concentration : 32.9 gm/dL  Auto Neutrophil # : 7.7 K/uL  Auto Lymphocyte # : 1.6 K/uL  Auto Monocyte # : 0.7 K/uL  Auto Eosinophil # : 0.0 K/uL  Auto Basophil # : 0.1 K/uL  Auto Neutrophil % : 76.0 %  Auto Lymphocyte % : 15.8 %  Auto Monocyte % : 7.3 %  Auto Eosinophil % : 0.3 %  Auto Basophil % : 0.6 %    28 Jul 2017 06:01    142    |  107    |  10     ----------------------------<  98     3.7     |  25     |  0.37     Ca    7.8        28 Jul 2017 06:01          CAPILLARY BLOOD GLUCOSE          RECENT CULTURES:        RESPIRATORY CULTURES:      cardiac Enzyme:        Anemia panel:          RADIOLOGY & ADDITIONAL TESTS:  Xray Abd 07/28: Nonobstructive bowel gas pattern.    Personally reviewed.     Consultant(s) Notes Reviewed:  [x] YES  [ ] NO    Care Discussed with [x] Consultants  [x] Patient  [ ] Family  [ ]      [ ] Other; RN  DVT ppx lovenox Patient is a 36y old  Female who presents with a chief complaint of Abdominal pain, nausea, vomiting x 2 this am (28 Jul 2017 12:09)      INTERVAL HPI/OVERNIGHT EVENTS: Pt feels well. Passing flatus well. Mild post-op abdominal discomfort near incisions. Denies fever, chills, nausea, vomiting, SOB.     MEDICATIONS  (STANDING):    MEDICATIONS  (PRN):      Allergies    narcotic analgesics (Unknown)    Intolerances      REVIEW OF SYSTEMS:  CONSTITUTIONAL: No fever or chills  HEENT:  No headache, no sore throat  RESPIRATORY: No cough, wheezing, or shortness of breath  CARDIOVASCULAR: No chest pain, palpitations, or leg swelling  GASTROINTESTINAL: Mild discomfort near incisions. No nausea, vomiting, or diarrhea  GENITOURINARY: No dysuria, frequency, or hematuria  NEUROLOGICAL: no focal weakness or dizziness  SKIN:  No rashes or lesions   MUSCULOSKELETAL: no myalgias   PSYCHIATRIC: No depression or anxiety    Vital Signs Last 24 Hrs  T(C): 37 (28 Jul 2017 16:42), Max: 37 (28 Jul 2017 16:42)  T(F): 98.6 (28 Jul 2017 16:42), Max: 98.6 (28 Jul 2017 16:42)  HR: 68 (28 Jul 2017 16:42) (68 - 71)  BP: 94/58 (28 Jul 2017 16:49) (94/58 - 103/73)  BP(mean): --  RR: 15 (28 Jul 2017 16:42) (15 - 16)  SpO2: 99% (28 Jul 2017 16:42) (95% - 99%)    PHYSICAL EXAM:  GENERAL: NAD  HEENT:  EOMI, moist mucous membranes  CHEST/LUNG:  CTA b/l, no rales, wheezes, or rhonchi  HEART:  RRR, S1, S2  ABDOMEN:  BS+, soft, mild general tenderness without guarding, nondistended  EXTREMITIES: no edema or calf tenderness  SKIN:  no rash  NERVOUS SYSTEM: AA&Ox3    LABS:                        11.3   10.1  )-----------( 255      ( 28 Jul 2017 06:01 )             34.4     CBC Full  -  ( 28 Jul 2017 06:01 )  WBC Count : 10.1 K/uL  Hemoglobin : 11.3 g/dL  Hematocrit : 34.4 %  Platelet Count - Automated : 255 K/uL  Mean Cell Volume : 86.1 fl  Mean Cell Hemoglobin : 28.3 pg  Mean Cell Hemoglobin Concentration : 32.9 gm/dL  Auto Neutrophil # : 7.7 K/uL  Auto Lymphocyte # : 1.6 K/uL  Auto Monocyte # : 0.7 K/uL  Auto Eosinophil # : 0.0 K/uL  Auto Basophil # : 0.1 K/uL  Auto Neutrophil % : 76.0 %  Auto Lymphocyte % : 15.8 %  Auto Monocyte % : 7.3 %  Auto Eosinophil % : 0.3 %  Auto Basophil % : 0.6 %    28 Jul 2017 06:01    142    |  107    |  10     ----------------------------<  98     3.7     |  25     |  0.37     Ca    7.8        28 Jul 2017 06:01          CAPILLARY BLOOD GLUCOSE          RECENT CULTURES:        RESPIRATORY CULTURES:      cardiac Enzyme:        Anemia panel:          RADIOLOGY & ADDITIONAL TESTS:  Xray Abd 07/28: Nonobstructive bowel gas pattern.    Personally reviewed.     Consultant(s) Notes Reviewed:  [x] YES  [ ] NO    Care Discussed with [x] Consultants  [x] Patient  [ ] Family  [ ]      [ ] Other; RN  DVT ppx lovenox

## 2019-06-22 NOTE — ED ADULT NURSE NOTE - NS ED NURSE TRANSPORT WITH
Called and spoke to pt, notified of xray results. No further questions or concerns.
Early degenerative arthritis is noted.  No acute fractures.  Please keep your follow-up appointment after physical therapy.
cardiac monitor

## 2019-06-24 NOTE — ED ADULT NURSE NOTE - CADM POA URETHRAL CATHETER
Outpatient Dialysis Note    Confirmed patient is active at:    [facility]  [address]   [place] [address]      Schedule: Monday, Wednesday, Friday  Time: 3:45 pm    Spoke with Nadine at facility who confirmed.    Forwarded records for review.    Dialysis Coordinator, [facility]  Kelli Anderson [phone]   No

## 2020-01-30 NOTE — BRIEF OPERATIVE NOTE - ASSISTANT(S)
Psychiatric Complaint





- HPI Summary


HPI Summary: 


Patient is a 22 y/o F w/ Hx of bipolar disorder, depression, and anxiety who 

[facility] of [facility] and SI. She states that she met 

with her friend today and discussed her Sx, patient was advised to come to ED 

for evaluation. She notes that she was at [facility] months ago for psychiatric 

issues as well. Patient reports that she has been off of her psychiatric 

medications for the past 3.5 months. She reports excessive alcohol, marijuana, 

and occasional cocaine usage. Patient reports last alcohol consumption was 

three days ago. Home medications and allergies are reviewed. 








- History Of Current Complaint


Chief Complaint: EDMentalHealth


Time Seen by Provider: 01/30/20 14:32


Hx Obtained From: Patient


Hx Last Menstrual Period: no periods


Onset/Duration: Still Present


Timing: Constant


Character: Depressed


Has Suicidal: Reports: Thoughts





- Allergies/Home Medications


Allergies/Adverse Reactions: 


 Allergies











Allergy/AdvReac Type Severity Reaction Status Date / Time


 


No Known Allergies Allergy   Verified 01/30/20 14:24











Home Medications: 


 Home Medications





NK [No Home Medications Reported]  01/30/20 [History Confirmed 01/30/20]











PMH/Surg Hx/FS Hx/Imm Hx


Respiratory History: Reports: Hx Asthma


Sensory History: 


   Denies: Hx Contacts or Glasses, Hx Deafness, Hx Hearing Aid


Opthamlomology History: 


   Denies: Hx Contacts or Glasses


Psychiatric History: Reports: Hx Anxiety, Hx Depression, Hx Community Mental 

Health Tx, Hx Bipolar Disorder


   Denies: Hx Eating Disorder, Hx of Violent Episodes Against Others





- Surgical History


Surgery Procedure, Year, and Place: HYMENECTOMY


Infectious Disease History: No


Infectious Disease History: 


   Denies: Traveled Outside the [place] in Last 30 Days





- Family History


Known Family History: Positive: Other - SI





- Social History


Alcohol Use: Weekly


Alcohol Amount: 4X/ week


Substance Use Type: Reports: Marijuana


Substance Use Comment - Amount & Last Used: a few times/ week


Smoking Status (MU): Light Every Day Tobacco Smoker


Amount Used/How Often: 5 cigs/ week





Review of Systems


Negative: Fever - on vitals, temp is 98.8 F 


Psychological: Other - positive - SI 


Positive: Depressed


All Other Systems Reviewed And Are Negative: Yes





Physical Exam





- Summary


Physical Exam Summary: 


VITAL SIGNS: Reviewed.


GENERAL: Patient is a well-developed and nourished female who is lying 

comfortable in the stretcher. Patient is not in any acute respiratory distress.


HEAD AND FACE: No signs of trauma. No ecchymosis, hematomas or skull 

depressions. No sinus tenderness.


EYES: PERRLA, EOMI x 2, No injected conjunctiva, no nystagmus.


EARS: Hearing grossly intact. Ear canals and tympanic membranes are within 

normal limits.


MOUTH: Oropharynx within normal limits.


NECK: Supple, trachea is midline, no adenopathy, no JVD, no carotid bruit, no c-

spine tenderness, neck with full ROM.


CHEST: Symmetric, no tenderness at palpation.


LUNGS: Clear to auscultation bilaterally. No wheezing or crackles.


CVS: Regular rate and rhythm, S1 and S2 present, no murmurs or gallops 

appreciated.


ABDOMEN: Soft, non-tender. No signs of distention. No rebound, no guarding, and 

no masses palpated. Bowel sounds are normal.


EXTREMITIES: FROM in all major joints, no edema, no cyanosis or clubbing.


NEURO: Alert and oriented x 3. No acute neurological deficits. Speech is normal 

and follows commands.


SKIN: Dry and warm.








Triage Information Reviewed: Yes


Vital Signs On Initial Exam: 


 Initial Vitals











Temp Pulse Resp BP Pulse Ox


 


 98.8 F   79   18   135/69   100 


 


 01/30/20 14:19  01/30/20 14:19  01/30/20 14:19  01/30/20 14:19  01/30/20 14:19











Vital Signs Reviewed: Yes





Procedures





- Sedation


Patient Received Moderate/Deep Sedation with Procedure: No





Diagnostics





- Vital Signs


 Vital Signs











  Temp Pulse Resp BP Pulse Ox


 


 01/30/20 14:19  98.8 F  79  18  135/69  100














- Laboratory


Result Diagrams: 


 01/30/20 14:56





 01/30/20 14:56


Lab Statement: Any lab studies that have been ordered have been reviewed, and 

results considered in the medical decision making process.





Course/Dx





- Course


Assessment/Plan: Patient is a 22 y/o F w/ Hx of bipolar disorder, depression, 

and anxiety who [facility] of depression and SI. She 

states that she met with her friend today and discussed her Sx, patient was 

advised to come to ED for evaluation. She notes that she was at [facility] ten months 

ago for psychiatric issues as well. Patient reports that she has been off of 

her psychiatric medications for the past 3.5 months. She reports excessive 

alcohol, marijuana, and occasional cocaine usage. Patient reports last alcohol 

consumption was three days ago. Home medications and allergies are reviewed.  

Blood work w/o a significant abnormality.  She is medically cleared.  She is 

awaiting a [facility].  Patient is hemodynamically stable and A+O x 3.  Patient was 

evaluated by Dr. Mix and he recommends admission to his services.  Diagnosis 

is bipolar 1.  The patient is hemodynamically stable.





- Differential Dx/Clinical Impression


Differential Diagnosis/HQI/PQRI: Positive: Anxiety, Depression, Suicidal 

Ideation


Provider Diagnosis: 


 Bipolar 1 disorder








- Physician Notifications


Discussed Care Of Patient With: Larry Mix


Time Discussed With Above Provider: 18:46


Instructed by Provider To: Other - Patient's case was reviewed by Dr. Mix, 

patient will be admitted to [facility].





Discharge ED





- Sign-Out/Discharge


Documenting (check all that apply): Patient Departure - admit





- Discharge Plan


Condition: Stable


Disposition: ADMITTED TO [facility]





- Billing Disposition and Condition


Condition: STABLE


Disposition: Admitted to [facility]





- Attestation Statements


Document Initiated by Emiliibe: Yes


Documenting Scribe: MARCUS LYONS


Provider For Whom Sara is Documenting (Include Credential): ELAINE LOJA MD


Scribe Attestation: 


MARCUS JUAREZ scribed for ELAINE LOJA MD on 01/30/20 at 2136. 


Scribe Documentation Reviewed: Yes


Provider Attestation: 


The documentation as recorded by the MARCUS franco accurately reflects 

the service I personally performed and the decisions made by me, ELAINE LOJA MD


Status of Scribe Document: Viewed
Shifteh

## 2020-07-02 NOTE — ED PROVIDER NOTE - CONSTITUTIONAL, MLM
Well appearing, well nourished, awake, alert, oriented to person, place, time/situation and in mild apparent distress due to pain. normal... I have personally performed a face to face diagnostic evaluation on this patient. I have reviewed the ACP note and agree with the history, exam and plan of care, except as noted.

## 2021-01-06 NOTE — ED PROVIDER NOTE - CHPI ED SYMPTOMS POS
Called Jennifer and advised of orders for labs and urine testing. Appointment made for 1230 today. Advised once we have results we will call her. She expressed understanding.   
Patient called stating that she takes Eliquis and  she's seeing that her urine is sometimes dark brown and sometimes reddish. She read that she should contact doctor if this occurs. She wants to know if she should be concern.  
She xhould come in for cbc, CMP abnd urinalysis and culture.  Today if possible then I can call her with results.  No change in dosing at thsi ttime  
Spoke with Jennifer. She reports that she is taking Eliquis 5mg BID. She stated that he dark urine 'comes and goes'. She reports that past two days her first AM urine has been dark but then lightens up throughout the day. She denies any s/s of infection. She added that her eyes are sore and she is having joint pain. She would like to know if her Eliquis dose needs to be adjusted. Advised I would review with Dr. Mcintosh.   
ear pain

## 2022-12-28 NOTE — PROGRESS NOTE ADULT - PROBLEM SELECTOR PLAN 1
Resolution of SBO.  Advance to regular diet.  If tolerated may be discharged home today.  F/u with me in one week.  Call for appointment.  Postoperative instructions have been provided including avoidance of heavy lifting and strenuous activities in excess of 20-25 pounds for duration of 4-6 weeks. The patient may shower keeping the incisions clean, dry, covered as needed. ---

## 2023-03-06 NOTE — ED ADULT NURSE NOTE - NS ED NURSE LEVEL OF CONSCIOUSNESS MENTAL STATUS
Goal Outcome Evaluation:              Outcome Evaluation: Pt is resting in bed at this time. Wound care completed per order. Wound vac chnaged per wound care RN. No acute changes noted at this time. Will continue plan of care.   Alert

## 2023-03-28 NOTE — H&P ADULT - NSHPOUTPATIENTPROVIDERS_GEN_ALL_CORE
Physical Therapy Treatment Note  Name: Brad Guzman MRN: [id number] :   1938   Date:  3/28/2023   Admission Date: 3/25/2023 Room:  [address] [place] [address]     Restrictions/Precautions:          general precautions, fall risk    Communication with other providers:  RN    Subjective:  Patient states:  \"I would like to walk to the bathroom\"  Pain:   Location, Type, Intensity (0/10 to 10/10):  denies pain at first but asking for pain medication by end of session for legs. RN notified    Objective:    Observation:  Pt supine in bed with daughter present. Pt agreeable to session    Treatment, including education/measures:    Bed mobility: pt completed supine to sitting EOB SBA with cues for sequencing. Transfers: pt completed STS from EOB CGA, to/from commode x2 trials min A and x1 trial CGA, and to chair CGA. Cues provided for hand placement and sequencing    Gait: pt ambulated 10' + 10' with RW CGA with decreased berkley and decreased step length bilaterally. Pt slightly SOB after first bout but quickly recovered with seated rest break. No SOB noted after second bout. Balance: pt stood CGA during pericare without any LOB    Assessment / Impression:    Pt up in chair at end of session with alarm on and daughter present.     Patient's tolerance of treatment:  fair   Adverse Reaction: n/a  Significant change in status and impact:  improved gait and transfers compared to eval  Barriers to improvement:  decreased endurance, impaired transfers, impaired gait    Plan for Next Session:    Continue to address transfer training and gait training in future sessions    Time in:  1051  Time out:  1115  Timed treatment minutes:  24  Total treatment time:  24    Previously filed items:  Social/Functional History  Lives With: Alone        Short Term Goals  Time Frame for Short Term Goals: 1 week  Short Term Goal 1: Pt to complete all bed mobility min A  Short Term Goal 2: pt to sit EOB 10 minutes SBA with no LOB  Short Term Goal 3: pt to complete STS transfers to/from bed, commode, and chair CGA  Short Term Goal 4: pt to ambulate 25' with LRAD min A    Electronically signed by:    Aida Hernandez, PT  3/28/2023, 12:21 PM None

## 2024-01-17 NOTE — ED ADULT NURSE NOTE - CHIEF COMPLAINT
COPAY CARD OBTAINED    Medication: AYVAKIT 25 MG PO TABS  Sponsor: Srinivas  Member ID: [id number]  BIN: [id number]  PCN: [id number]  Group: [id number]  Expected Copay: $  0  Copay card Monthly Max Amount: $    Copay card Annual Amount: $ 26,000     The patient is a 37y Female complaining of ear pain.

## 2024-01-24 NOTE — PATIENT PROFILE ADULT. - ANESTHESIA, PREVIOUS REACTION, PROFILE
none [Follow-Up] : a follow-up visit for [FreeTextEntry3] : right aortic arch, aberrant left subclavian artery, s/p vascular ring repair [Parents] : parents